# Patient Record
Sex: FEMALE | Race: WHITE | Employment: FULL TIME | URBAN - METROPOLITAN AREA
[De-identification: names, ages, dates, MRNs, and addresses within clinical notes are randomized per-mention and may not be internally consistent; named-entity substitution may affect disease eponyms.]

---

## 2019-12-19 ENCOUNTER — HOSPITAL ENCOUNTER (OUTPATIENT)
Dept: PHYSICAL THERAPY | Age: 59
Discharge: HOME | End: 2019-12-19
Payer: COMMERCIAL

## 2019-12-19 PROCEDURE — 97162 PT EVAL MOD COMPLEX 30 MIN: CPT

## 2019-12-19 PROCEDURE — 97110 THERAPEUTIC EXERCISES: CPT

## 2019-12-19 NOTE — THERAPY EVALUATION
Montserrat Rich  : 1960  Primary: [facility]  Secondary: [facility] [address] at . Elvin Bauman [address]  [address]. [address], [address]  [phone]   [phone]          OUTPATIENT PHYSICAL THERAPY:Initial Assessment 2019   ICD-10: Treatment Diagnosis  Mixed incontinence (N39.46)   Other muscle spasm (R52.569)   Muscle weakness (generalized) (M62.81)   Other lack of coordination (R27.8)   Precautions/Allergies:   Sulfa (sulfonamide antibiotics); Celecoxib; and Pcn [penicillins]   TREATMENT PLAN:  Effective Dates: 2019 TO 3/18/2020 (90 days). Frequency/Duration: 1 time a week for 90 Day(s) MEDICAL/REFERRING DIAGNOSIS:  Full incontinence of feces [R15.9]   DATE OF ONSET: May 2017  REFERRING PHYSICIAN: Kath De La Rosa MD MD Orders: evaluate and treat  Return MD Appointment: 2020     INITIAL ASSESSMENT:  Ms. Rowdy Castillo presents to physical therapy with c/o pain with vaginal insertion during intercourse and speculum and rectal examinations, urine and fecal leakage with lifting, bending, and post intercourse. She presents with decreased vaginal vault size, muscle guarding with entry into vagina with internal PF assessment, tenderness at Ischiocavernosus and bulbocavernosus (B) and referral of L hip pain with palpation of L levator ani muscles. Pt also presents with multiple scars with decreased mobility, pain along anterior pelvis and pubic bone. She lack PFM coordination and strength due to muscle guarding and tightness. These S/S are indicative of Mixed urinary incontinence, fecal incontinence, decreased coordination and strength and muscle spasms. Patient will benefit from manual interventions (soft tissue mobilizations, joint mobilizations, TDN, MET,etc.) as indicated, therapeutic exercises/activities as indicated, and biofeedback for neuromuscular re-education to improve patient's quality of life and functional mobility. PROBLEM LIST (Impacting functional limitations):  1. Decreased Strength  2. Decreased ADL/Functional Activities  3. Decreased Transfer Abilities  4. Decreased Ambulation Ability/Technique  5. Increased Pain  6. Decreased Activity Tolerance  7. Increased Fatigue  8. Increased Shortness of Breath  9. Decreased Flexibility/Joint Mobility  10. Decreased Skin Integrity/Hygeine  11. Decreased [place] with Home Exercise Program INTERVENTIONS PLANNED:  1. Balance Exercise  2. Bed Mobility  3. Cold  4. Electrical Stimulation  5. Family Education  6. Gait Training  7. Heat  8. Home Exercise Program (HEP)  9. Manual Therapy  10. Neuromuscular Re-education/Strengthening  11. Range of Motion (ROM)  12. Therapeutic Activites  13. Therapeutic Exercise/Strengthening  14. Transcutaneous Electrical Nerve Stimulation (TENS)  15. Transfer Training  16. Ultrasound (US)     GOALS: (Goals have been discussed and agreed upon with patient.)  Short-Term Functional Goals: Time Frame: 6 weeks  1. Patient will be independent and compliant with HEP   2. Patient will tolerate manual interventions (joint mobilizations, STM, TDN, trigger point release, PROM, etc.) to improve joint ROM/soft tissue mobility of restricted structures to improve functional mobility. 3. Patient will report <3/10 pain with functional ADLs and vaginal insertion to have annual speculum and rectal examinations done to improve quality of life  Discharge Goals: Time Frame: 12 weeks  1. Pt will be independent with comprehensive HEP  2. Pt will demonstrate proper lifting mechanics to lift 10# from floor to stand 10x with min to no pain or compensation or leaking of urine of feces to demonstrate improved pressure control system for pelvic floor health. 3. Pt will report <1-2/10 pain with functional ADLs and vaginal insertion to have annual speculum and rectal examinations done  for improved mobility.    4. Pt will report increase water intake to >60 oz for good bladder health    Outcome Measure: Tool Used: Pelvic Floor Disability Index (PFDI-20)  Score:  Initial: 32 Most Recent: X (Date: -- )   Interpretation of Score: This survey asks questions concerning certain  bowel, bladder, or pelvic symptoms and how much this symptoms interfere with daily activiites. Each section is scored on a 0-4 scale, 4 representing the greatest disability. The scores of each section are added together for a total score out of 70. Medical Necessity:   · Patient is expected to demonstrate progress in strength, range of motion and coordination to reduce pain and urinary and fecal leakage with daily activities to improve quality of life    Reason for Services/Other Comments:  · Patient continues to require skilled intervention due to above mentioned deficits    Total Duration: Evaluation 30 minutes, treatment 30 minutes       Rehabilitation Potential For Stated Goals: [address] Cloud's therapy, I certify that the treatment plan above will be carried out by a therapist or under their direction. Thank you for this referral,  Марина Graham     Referring Physician Signature: Sury Nava MD              Date                    PAIN/SUBJECTIVE:   Initial:   2/10 Post Session:  3/10     HISTORY:   History of Present Injury/Illness (Reason for Referral):  Greg Sorto states she was dx with rectal cancer in May 2017 and had a colostomy bag until June 2018. She was still dealing with fecal incontinence she had a device placed May 2019 in her back to help her bulk up her stool and hold it to get to the bathroom; NEUROSTIMULATOR ELECTRODE ARRAY;SACRAL NERVE (TRANSFORAMINAL PLACEMENT). She then went to see a nutritionist to help her bulk her stool.      Urinary: Frequency 10 x/day, 1 x/night.                       -Positive for stress urinary incontinence (ABIGAIL), urge urinary incontinence (UUI), mixed urinary incontinence (VALERY), urgency, frequency                     -Pt uses/does not use pads for protection; 1-3/4 pads per day (PPD)                     -Denies , incomplete emptying, urinary hesitancy, dysuria, hematuria, nocturia. Fluid intake: 16 oz water/day; bladder irritants include: 2 cups Coffee, 1-2 gatorade, 1-2 cans of tea    Bowel: Frequency 3x/day. -Positive for pushing/straining with BM, incomplete emptying, fecal incontinence.                      -Negative for pain with bowel movement (BM), pushing/straining with BM, incomplete emptying, fecal incontinence, constipation.                       -[place] stool type: 6-7 but mostly 4-5                      -Use of stool softeners or laxatives? No-uses -Diphen/atrop 2.5 mph1x/aday and 1 Fibercon a day    Sexual: Pt is sexually active. -Male partners.                    -Positive for dyspareunia. -went to the [facility] [place] and they recommended a different lubricant (water based) and vibrator which has seemed to help with the lubrication better.  -A couple of times I have had bowel movements after sex and its embarrassing for me.     Geraldine Mcdaniel Pain: Location: anterior pelvic area and L anterior groin.                         -Worse with sex. -Relieved with a few minutes after sex. -Max pain 6-7/10. Min pain 0/10. Past Medical History/Comorbidities:   Ms. Katherine Llamas  has a past medical history of Chronic pain.  She also has no past medical history of Arrhythmia, Arthritis, Asthma, Autoimmune disease ([address] CAD (coronary artery disease), Cancer ([address] Chronic kidney disease, Coagulation defects, COPD, Diabetes ([address] Difficult intubation, GERD (gastroesophageal reflux disease), Heart failure ([address] Hypertension, Liver disease, Malignant hyperthermia due to anesthesia, Nausea & vomiting, Other ill-defined conditions(799.89), Pseudocholinesterase deficiency, Psychiatric disorder, PUD (peptic ulcer disease), Seizures ([address] Stroke ([address] Thromboembolus ([facility] Thyroid disease, Unspecified adverse effect of anesthesia, or Unspecified sleep apnea. Ms. Chantale Fung  has a past surgical history that includes hx heent and hx  section. NEUROSTIMULATOR ELECTRODE ARRAY;SACRAL NERVE (TRANSFORAMINAL PLACEMENT) (May 2019) and Rectal surgical removal of cancer (2017)    Social History/Living Environment:    Lives with   Have you ever had any pelvic trauma (orthopedic in nature, fall, MVA, etc.)? no  Have you ever experienced any unwanted physical or sexual contact? no  Have you ever experienced any form of medical trauma (GYN, urological, GI, etc)? -3 vaginal births and 1  due to breach birth    Prior Level of Function/Work/Activity:   lifting, moving around       Ambulatory/Rehab Services H2 Model Falls Risk Assessment    Risk Factors:       No Risk Factors Identified Ability to Rise from Chair:       (0)  Ability to rise in a single movement    Falls Prevention Plan:       No modifications necessary   Total: (5 or greater = High Risk): 0     [facility] of [employer]. All Rights Reserved. [facility] Patent #[id number]. Federal Law prohibits the replication, distribution or use without written permission from [facility] [employer]     Current Medications:       Current Outpatient Medications:     mirabegron ER (MYRBETRIQ) 50 mg ER tablet, Take 50 mg by mouth daily. , Disp: , Rfl:     MYRBETRIQ 50 mg ER tablet, Take 1 Tab by mouth daily. , Disp: 30 Tab, Rfl: 11   Date Last Reviewed:  2019   Number of Personal Factors/Comorbidities that affect the Plan of Care: 3+: HIGH COMPLEXITY   EXAMINATION:   OBSERVATION:   External Observation:   · Voluntary Contraction: absent  · Voluntary Relaxation: guarded-unable  · Involuntary Contraction: minimal  · Involuntary Relaxation: guarded  · Perineal Body Assessment: WFL  · Anal [place]: absent  · Skin Integrity: good  · Vaginal Vault Size: decreased    PALPATION: -unable to assess further into layer 2 of PFM due to pain and muscle guarding    Superficial Pelvic Floor Musculature (PFM): Tender? Intermediate PFM Tender? Deep PFM Tender? External Palpation Tender? R Superficial Transverse Perineal Y R Deep Transverse Perineal  R. Puborectalis  Abdominals Y   L Superficial Transverse Perineal Y L Deep Transverse Perineal  L. Puborectalis Y R/L hip adductors Y   R Ischiocavernosus Y R Compressor Urethra  R. Pubococcygeus  R/L Iliacus/psoas Y   L Ischiocavernosus Y L Compressor Urethra  L. Pubococcygeus  R/L Glutes     R Bulbocavernosus Y   R. Ileococcygeus  R/L Piriformis    L Bulbocavernosus Y   L. Ileococcygeus          R. Obturator Internus          L. Obturator Internus          R. Coccygeus          L. Coccygeus        RANGE OF MOTION:  Pain in abdomen with end-range hip flexion B and hip abduction due to hip adductor tightness    STRENGTH:  P: Power, E: Endurance, R: Repetitions, QF: Quick Flicks, TrA: Transverse Abdominus  P 1   E    R    QF    TrA      COORDINATION:  Diaphragmatic breathing (DB): primary chest    SPECIAL TESTS:  Q-tip test: nt  Supine cough test: neg  POP-Q: (Pelvic Organ Prolapse - Quantification Exam) per MD note: none ntoed    SEMG evaluation (Date TBD):  ·  Resting Tone:   ·  Quality of Resting Tone: Not tested  ·  5 Second Hold:  uV  ·  10 Second Hold: uV  ·  Quality of Recruitment:   ·  Quality of Relaxation:   ·  Quality of Holding:   ·  Stability of Hold:   ·  Stability of Rest:      Body Structures Involved:  1. Nerves  2. Thoracic Cage  3. Digestive Structures  4. Bones  5. Joints  6. Muscles  7. Ligaments Body Functions Affected:  1. Sensory/Pain  2. Respiratory  3. Genitourinary  4. Neuromusculoskeletal  5. Movement Related  6. Digestive Activities and Participation Affected:  1. Learning and Applying Knowledge  2. General Tasks and Demands  3. Communication  4.  Mobility   Number of elements (examined above) that affect the Plan of Care: 3: MODERATE COMPLEXITY   CLINICAL PRESENTATION:   Presentation: Evolving clinical presentation with changing clinical characteristics: MODERATE COMPLEXITY   CLINICAL DECISION MAKING:   Use of outcome tool(s) and clinical judgement create a POC that gives a: Questionable prediction of patient's progress: MODERATE COMPLEXITY        Jeanne Mantilla PT, DPT

## 2019-12-19 NOTE — PROGRESS NOTES
OUTPATIENT PHYSICAL THERAPY: Daily Treatment Note 12/19/2019  Visit Count:  1      ICD-10: Treatment Diagnosis  Mixed incontinence (N39.46)   Other muscle spasm (R20.403)   Muscle weakness (generalized) (M62.81)   Other lack of coordination (R27.8)   Precautions/Allergies:   Sulfa (sulfonamide antibiotics); Celecoxib; and Pcn [penicillins]   TREATMENT PLAN:  Effective Dates: 12/19/2019 TO 3/18/2020 (90 days). Frequency/Duration: 1 time a week for 90 Day(s) MEDICAL/REFERRING DIAGNOSIS:  Full incontinence of feces [R15.9]   DATE OF ONSET: May 2017  REFERRING PHYSICIAN: Halle Phan MD MD Orders: evaluate and treat  Return MD Appointment: March 2020     MEDICAL/REFERRING DIAGNOSIS:  Full incontinence of feces [R15.9]   REFERRING PHYSICIAN: Halle Phan MD  Pre-treatment Symptoms/Complaints:  Pain with intercourse and leaking urine and feces. Pain: Initial:   2 Post Session:  3/10   Medications Last Reviewed:  12/19/2019    Updated Objective Findings:  See evaluation note from today  Very guarded with vaginal insertion  TTP IC,BC (B) and STP and L levator ani  Mulitple scar   TREATMENT:   THERAPEUTIC EXERCISE: (30 minutes):  Exercises per grid below to improve mobility, strength, and coordination. Required moderate visual, verbal, manual, and tactile cues to promote proper body alignment, promote proper body posture, promote proper body mechanics, and promote proper body breathing techniques. Progressed resistance, range, repetitions, and complexity of movement as indicated.     Exercises:  Patient instructed in pelvic floor exercises listed below:   Date:  12/19/19 Date:   Date:     Activity/Exercise Parameters Parameters Parameters   Education HEP, POC, PT goals, PF anatomy/physiology, proper toileting and increase water for bladder health     PF connection breath 5 mins     PF drops 5x     Frog stretch 1 min     Happy baby 1 min     Initial stretching #1 Handout provided and edu on stretches     Self scar mobilizations 2 mins-PT demonstrated                                 MANUAL THERAPY: (0 minutes): Joint mobilization and Soft tissue mobilization was utilized and necessary because of the patient's restricted joint motion, painful spasm, loss of articular motion, and restricted motion of soft tissue. (Used abbreviations: MET - muscle energy technique; SCS- Strain counter strain; CTM- Connective tissue mobilizations; CR- Contract/relax; SP- Sustained pressure, TrP- Trigger point release, IASTM- Instrument assisted soft tissue mobilizations, TDN- Trigger point dry needling). Pt gives verbal and written consent to internal assessment/treatment; no chaperon present. [employer]  The following educational topics were reviewed with patient:  pelvic floor anatomy/pathology and function related to her symptoms, water intake increase, bladder health tips, proper toileting position. Treatment/Session Summary:    · Response to Treatment:  Pt demonstrated understanding of POC and PT goals. Pt instructed in initial HEP, minor increase in PFM soreness due to muscle guarding with initial assessment. · Communication/Consultation:  POC, PT goals, HEP   · Equipment provided today:  HEP handout   · Recommendations/Intent for next treatment session: Next visit will focus on Manual interventions as indicated, progression of exercises/ROM as indicated.     Total Treatment Billable Duration:  30 eval, 30 therex minutes  PT Patient Time In/Time Out  Time In: 0910  [facility]    Future Appointments   Date Time Provider Yaya Mast   12/26/2019 11:15 AM Rambo Veronica [facility]   1/9/2020 10:00 [facility]   1/16/2020 10:00 AM Priya [facility]   1/23/2020 10:00 AM Rambo Veronica [facility]

## 2019-12-26 ENCOUNTER — HOSPITAL ENCOUNTER (OUTPATIENT)
Dept: PHYSICAL THERAPY | Age: 59
Discharge: HOME | End: 2019-12-26
Payer: COMMERCIAL

## 2019-12-26 PROCEDURE — 97110 THERAPEUTIC EXERCISES: CPT

## 2019-12-26 PROCEDURE — 97140 MANUAL THERAPY 1/> REGIONS: CPT

## 2020-01-08 NOTE — PROGRESS NOTES
OUTPATIENT PHYSICAL THERAPY: Daily Treatment Note 1/9/2020  Visit Count:  3    Patient has Neuro stimulator; NO BIOFEEDBACK or E-STIM     ICD-10: Treatment Diagnosis  Mixed incontinence (N39.46)   Other muscle spasm (X10.869)   Muscle weakness (generalized) (M62.81)   Other lack of coordination (R27.8)   Precautions/Allergies:   Sulfa (sulfonamide antibiotics); Celecoxib; and Pcn [penicillins]   TREATMENT PLAN:  Effective Dates: 12/19/2019 TO 3/18/2020 (90 days). Frequency/Duration: 1 time a week for 90 Day(s) MEDICAL/REFERRING DIAGNOSIS:  Full incontinence of feces [R15.9]   DATE OF ONSET: May 2017  REFERRING PHYSICIAN: Arsenio Mccoy MD MD Orders: evaluate and treat  Return MD Appointment: March 2020     MEDICAL/REFERRING DIAGNOSIS:  Full incontinence of feces [R15.9]   REFERRING PHYSICIAN: Arsenio Mccoy MD  Pre-treatment Symptoms/Complaints:  Find that I drink more water at work than home, but still doing better. My  also says he likes not  Seeing the cringing on my face anymore intercourse. I am having much less pain with intercourse! Pain: Initial:   2 Post Session:  0/10   Medications Last Reviewed:  1/9/2020    Updated Objective Findings:  See evaluation note from initial visit and below for updated objective measures from treatment sessions  Very guarded with vaginal insertion  TTP IC,BC (B) and STP and L levator ani  Mulitple scar   TREATMENT:   THERAPEUTIC EXERCISE: (8 minutes):  Exercises per grid below to improve mobility, strength, and coordination. Required moderate visual, verbal, manual, and tactile cues to promote proper body alignment, promote proper body posture, promote proper body mechanics, and promote proper body breathing techniques. Progressed resistance, range, repetitions, and complexity of movement as indicated.     Exercises:  Patient instructed in pelvic floor exercises listed below:   Date:  12/19/19 Date:  12/26/19 Date:  1/9/20   Activity/Exercise Parameters Parameters Parameters   Education HEP, POC, PT goals, PF anatomy/physiology, proper toileting and increase water for bladder health  PF relaxation continuation, stretches   PF connection breath 5 mins     PF drops 5x     Frog stretch 1 min     Happy baby 1 min 2 mins    Initial stretching #1 Handout provided and edu on stretches     Self scar mobilizations 2 mins-PT demonstrated     Hip flexors stretch EOB  2 mins ea    Prone Rec Fem stretch  2 mins ea    Fwd fold stretch  5x    Seated piriformis stretch  2 min    Prone press up abdominal stretching   3 mins   Cat/Cow   Unable due to discomfort   DKTC   2 mins       MANUAL THERAPY: (40 minutes): Joint mobilization and Soft tissue mobilization was utilized and necessary because of the patient's restricted joint motion, painful spasm, loss of articular motion, and restricted motion of soft tissue. (Used abbreviations: MET - muscle energy technique; SCS- Strain counter strain; CTM- Connective tissue mobilizations; CR- Contract/relax; SP- Sustained pressure, TrP- Trigger point release, IASTM- Instrument assisted soft tissue mobilizations, TDN- Trigger point dry needling). -abdominal massage  -Iliopsoas release (B)  -lumbar paraspinal massage  -OI release R  -iliococcygeus release (R)  Sacral mobilization grade II-III P/A on sacral base  -Long axis traction R and passive IR stretching        Pt gives verbal and written consent to internal assessment/treatment; no chaperon present. [employer]  The following educational topics were reviewed with patient:  pelvic floor anatomy/pathology and function related to her symptoms, water intake increase, bladder health tips, proper toileting position. Treatment/Session Summary:    · Response to Treatment:  Pt demonstrated initial 7/10 pain at R levator ani muscle group and OI that was resolved to 0/10 post manual release.  Difficult to perform Cat/cow due to lumbar mobility restrictions   · Communication/Consultation: POC, PT goals, HEP   · Equipment provided today:  HEP handout   · Recommendations/Intent for next treatment session: Next visit will focus on Manual interventions as indicated, progression of exercises/ROM as indicated.     Total Treatment Billable Duration:  48 mins  PT Patient Time In/Time Out  Time In: 1005  [facility]: [facility]   Date Time Provider Yaya Mast   1/16/2020 10:00 AM Isabelle [facility]   1/23/2020 10:00 AM Yue Leroy [facility]

## 2020-01-09 ENCOUNTER — HOSPITAL ENCOUNTER (OUTPATIENT)
Dept: PHYSICAL THERAPY | Age: 60
Discharge: HOME | End: 2020-01-09
Payer: COMMERCIAL

## 2020-01-09 PROCEDURE — 97140 MANUAL THERAPY 1/> REGIONS: CPT

## 2020-01-09 PROCEDURE — 97110 THERAPEUTIC EXERCISES: CPT

## 2020-01-17 ENCOUNTER — HOSPITAL ENCOUNTER (OUTPATIENT)
Dept: PHYSICAL THERAPY | Age: 60
Discharge: HOME | End: 2020-01-17
Payer: COMMERCIAL

## 2020-01-17 PROCEDURE — 97110 THERAPEUTIC EXERCISES: CPT

## 2020-01-17 PROCEDURE — 97140 MANUAL THERAPY 1/> REGIONS: CPT

## 2020-01-17 NOTE — PROGRESS NOTES
OUTPATIENT PHYSICAL THERAPY: Daily Treatment Note 1/17/2020  Visit Count:  4    Patient has Neuro stimulator; NO BIOFEEDBACK or E-STIM     ICD-10: Treatment Diagnosis  Mixed incontinence (N39.46)   Other muscle spasm (G39.227)   Muscle weakness (generalized) (M62.81)   Other lack of coordination (R27.8)   Precautions/Allergies:   Sulfa (sulfonamide antibiotics); Celecoxib; and Pcn [penicillins]   TREATMENT PLAN:  Effective Dates: 12/19/2019 TO 3/18/2020 (90 days). Frequency/Duration: 1 time a week for 90 Day(s) MEDICAL/REFERRING DIAGNOSIS:  Full incontinence of feces [R15.9]   DATE OF ONSET: May 2017  REFERRING PHYSICIAN: Bri Chambers MD MD Orders: evaluate and treat  Return MD Appointment: March 2020     MEDICAL/REFERRING DIAGNOSIS:  Full incontinence of feces [R15.9]   REFERRING PHYSICIAN: Bri Chambers MD  Pre-treatment Symptoms/Complaints:  I have a bad case of hemorrhoids right now because pushing and straining. Also I had ice cream last night and up going to the bathroom at night. I go see my Chemo doctor for my 6 month check up. Still liking the piriformis stretch and getting more range and making all the stretches apart of my daily routine. Pain: Initial:   0 Post Session:  0/10   Medications Last Reviewed:  1/17/2020    Updated Objective Findings:  See evaluation note from initial visit and below for updated objective measures from treatment sessions  Very guarded with vaginal insertion  TTP IC,BC (B) and STP and L levator ani  Mulitple scar   TREATMENT:   THERAPEUTIC EXERCISE: (10 minutes):  Exercises per grid below to improve mobility, strength, and coordination. Required moderate visual, verbal, manual, and tactile cues to promote proper body alignment, promote proper body posture, promote proper body mechanics, and promote proper body breathing techniques. Progressed resistance, range, repetitions, and complexity of movement as indicated.     Exercises:  Patient instructed in pelvic floor exercises listed below:   Date:  12/19/19 Date:  12/26/19 Date:  1/9/20 Date  1/17/20   Activity/Exercise Parameters Parameters Parameters    Education HEP, POC, PT goals, PF anatomy/physiology, proper toileting and increase water for bladder health  PF relaxation continuation, stretches    PF connection breath 5 mins      PF drops 5x   5 MINS tactile and visual cues   Frog stretch 1 min      Happy baby 1 min 2 mins     Initial stretching #1 Handout provided and edu on stretches      Self scar mobilizations 2 mins-PT demonstrated      Hip flexors stretch EOB  2 mins ea  2 min ea   Prone Rec Fem stretch  2 mins ea     Fwd fold stretch  5x     Seated piriformis stretch  2 min  1 min   Prone press up abdominal stretching   3 mins    Cat/Cow   Unable due to discomfort    DKTC   2 mins    IR stretch    1 min ea       MANUAL THERAPY: (30 minutes): Joint mobilization and Soft tissue mobilization was utilized and necessary because of the patient's restricted joint motion, painful spasm, loss of articular motion, and restricted motion of soft tissue. (Used abbreviations: MET - muscle energy technique; SCS- Strain counter strain; CTM- Connective tissue mobilizations; CR- Contract/relax; SP- Sustained pressure, TrP- Trigger point release, IASTM- Instrument assisted soft tissue mobilizations, TDN- Trigger point dry needling). -abdominal massage  -Iliopsoas release (B)  -lumbar paraspinal massage (NOT TODAY)  -OI release R ( NOT TODAY)  -iliococcygeus release (R)  Sacral mobilization grade II-III P/A on sacral base (NOT TODAY)  -Long axis traction R and passive IR stretching  (NOT TODAY)          Pt gives verbal and written consent to internal assessment/treatment; no chaperon present. [employer] Portal  The following educational topics were reviewed with patient:  pelvic floor anatomy/pathology and function related to her symptoms, water intake increase, bladder health tips, proper toileting position.   Treatment/Session Summary:    · Response to Treatment:  Pt demonstrated almost no pain with manual interventions today. Improved PFM group ROM noted with PF drops today. · Communication/Consultation:  POC, PT goals, HEP   · Equipment provided today:  HEP handout   · Recommendations/Intent for next treatment session: Next visit will focus on Manual interventions as indicated, progression of exercises/ROM as indicated.     Total Treatment Billable Duration:  40 mins  PT Patient Time In/Time Out  Time In: 4969  Time Out: [address] [facility] [address] [place]   Date Time Provider Yaya Mast   1/23/2020 10:00 AM Raoul Root [facility]

## 2020-01-22 NOTE — PROGRESS NOTES
OUTPATIENT PHYSICAL THERAPY: Daily Treatment Note 1/23/2020  Visit Count:  5    Patient has Neuro stimulator; NO BIOFEEDBACK or E-STIM     ICD-10: Treatment Diagnosis  Mixed incontinence (N39.46)   Other muscle spasm (R11.767)   Muscle weakness (generalized) (M62.81)   Other lack of coordination (R27.8)   Precautions/Allergies:   Sulfa (sulfonamide antibiotics); Celecoxib; and Pcn [penicillins]   TREATMENT PLAN:  Effective Dates: 12/19/2019 TO 3/18/2020 (90 days). Frequency/Duration: 1 time a week for 90 Day(s) MEDICAL/REFERRING DIAGNOSIS:  Full incontinence of feces [R15.9]   DATE OF ONSET: May 2017  REFERRING PHYSICIAN: Luigi Morrison MD MD Orders: evaluate and treat  Return MD Appointment: March 2020     MEDICAL/REFERRING DIAGNOSIS:  Full incontinence of feces [R15.9]   REFERRING PHYSICIAN: Luigi Morrison MD  Pre-treatment Symptoms/Complaints:  I have been drinking more water and eating better, so have not had to push or strain. Stretching while playing games now to get the stretches in. I went to see Chemo doctor and only have one more 6 month check up then a check up every 3 years. Pain: Initial:   0 Post Session:  0/10   Medications Last Reviewed:  1/23/2020    Updated Objective Findings:  See evaluation note from initial visit and below for updated objective measures from treatment sessions  Very guarded with vaginal insertion  TTP IC,BC (B) and STP and L levator ani  Mulitple scar   TREATMENT:   THERAPEUTIC EXERCISE: (15 minutes):  Exercises per grid below to improve mobility, strength, and coordination. Required moderate visual, verbal, manual, and tactile cues to promote proper body alignment, promote proper body posture, promote proper body mechanics, and promote proper body breathing techniques. Progressed resistance, range, repetitions, and complexity of movement as indicated.     Exercises:  Patient instructed in pelvic floor exercises listed below:   Date:  12/19/19 Date:  12/26/19 Date:  1/9/20 Date  1/17/20 Date  1/23/20   Activity/Exercise Parameters Parameters Parameters     Education HEP, POC, PT goals, PF anatomy/physiology, proper toileting and increase water for bladder health  PF relaxation continuation, stretches     PF connection breath 5 mins       PF drops 5x   5 MINS tactile and visual cues 5 mins tactile cues   Frog stretch 1 min       Happy baby 1 min 2 mins   DKTC 2 mins   Initial stretching #1 Handout provided and edu on stretches       Self scar mobilizations 2 mins-PT demonstrated       Hip flexors stretch EOB  2 mins ea  2 min ea    Prone Rec Fem stretch  2 mins ea      Fwd fold stretch  5x      Seated piriformis stretch  2 min  1 min    Prone press up abdominal stretching   3 mins     Cat/Cow   Unable due to discomfort  3 mins with tactile and visual cues   DKTC   2 mins     IR stretch    1 min ea    Belly breathing         Pelvic tilt seated     10x for pelvic and lumbar mobiltiy   Hip adductor stretch     30\" 3x easeated and standing       MANUAL THERAPY: (41 minutes): Joint mobilization and Soft tissue mobilization was utilized and necessary because of the patient's restricted joint motion, painful spasm, loss of articular motion, and restricted motion of soft tissue. (Used abbreviations: MET - muscle energy technique; SCS- Strain counter strain; CTM- Connective tissue mobilizations; CR- Contract/relax; SP- Sustained pressure, TrP- Trigger point release, IASTM- Instrument assisted soft tissue mobilizations, TDN- Trigger point dry needling).    -abdominal massage  -glute and piriformis release (B) the R tighter than the L  -lumbar paraspinal massage   -OI release R ( NOT TODAY)  -iliococcygeus release and STP/DTP (R)  Sacral mobilization grade II-III P/A on sacral base  -Long axis traction R and passive IR stretching  (NOT TODAY)  -Lumbar and lower thoracic joint mobilizations grade III          Pt gives verbal and written consent to internal assessment/treatment; no ernesto present. [employer] Portal  The following educational topics were reviewed with patient:  pelvic floor anatomy/pathology and function related to her symptoms, water intake increase, bladder health tips, proper toileting position. Treatment/Session Summary:    · Response to Treatment:  Pt demonstrated increased tension and pain in R glut and iliococcygeus muscle today and R DTP that reduced with manual interventions. Pt very challenged with Cat/cow form but did better with pelvic tilts seated to get lumbar and pelvic dissociation and ROM. · Communication/Consultation:  POC, PT goals, HEP   · Equipment provided today:  HEP handout   · Recommendations/Intent for next treatment session: Next visit will focus on Manual interventions as indicated, progression of exercises/ROM as indicated.     Total Treatment Billable Duration:  56 mins  PT Patient Time In/Time Out  Time In: 6952  Time Out: [address]   Date Time Provider Yaya Mast   1/23/2020 10:00 AM Vaughn Arambula [facility]

## 2020-01-23 ENCOUNTER — HOSPITAL ENCOUNTER (OUTPATIENT)
Dept: PHYSICAL THERAPY | Age: 60
Discharge: HOME | End: 2020-01-23
Payer: COMMERCIAL

## 2020-01-23 PROCEDURE — 97140 MANUAL THERAPY 1/> REGIONS: CPT

## 2020-01-23 PROCEDURE — 97110 THERAPEUTIC EXERCISES: CPT

## 2020-02-06 ENCOUNTER — HOSPITAL ENCOUNTER (OUTPATIENT)
Dept: PHYSICAL THERAPY | Age: 60
Discharge: HOME | End: 2020-02-06
Payer: COMMERCIAL

## 2020-02-06 PROCEDURE — 97140 MANUAL THERAPY 1/> REGIONS: CPT

## 2020-02-06 PROCEDURE — 97110 THERAPEUTIC EXERCISES: CPT

## 2020-02-06 NOTE — PROGRESS NOTES
OUTPATIENT PHYSICAL THERAPY: Daily Treatment Note 2/6/2020  Visit Count:  6    Patient has Neuro stimulator; NO BIOFEEDBACK or E-STIM     ICD-10: Treatment Diagnosis  Mixed incontinence (N39.46)   Other muscle spasm (G52.983)   Muscle weakness (generalized) (M62.81)   Other lack of coordination (R27.8)   Precautions/Allergies:   Sulfa (sulfonamide antibiotics); Celecoxib; and Pcn [penicillins]   TREATMENT PLAN:  Effective Dates: 12/19/2019 TO 3/18/2020 (90 days). Frequency/Duration: 1 time a week for 90 Day(s) MEDICAL/REFERRING DIAGNOSIS:  Full incontinence of feces [R15.9]   DATE OF ONSET: May 2017  REFERRING PHYSICIAN: Jane Carpenter MD MD Orders: evaluate and treat  Return MD Appointment: March 2020     MEDICAL/REFERRING DIAGNOSIS:  Full incontinence of feces [R15.9]   REFERRING PHYSICIAN: Jane Carpenter MD  Pre-treatment Symptoms/Complaints:  I have been drinking more water and up to 32 oz. I have been doing well with the stretches too. I still leak urine occasionally but not as much. Mostly leaking when Im coughing or rushing to the bathroom. I have been breathing better with lifting but also going to the bathroom before any of the heavy lifting. Less pain though with intercourse still! Also BM are better. Pain: Initial:   0 Post Session:  0/10   Medications Last Reviewed:  2/6/2020    Updated Objective Findings:  See evaluation note from initial visit and below for updated objective measures from treatment sessions  Very guarded with vaginal insertion  TTP IC,BC (B) and STP and L levator ani  Mulitple scar   TREATMENT:   THERAPEUTIC EXERCISE: (10 minutes):  Exercises per grid below to improve mobility, strength, and coordination. Required moderate visual, verbal, manual, and tactile cues to promote proper body alignment, promote proper body posture, promote proper body mechanics, and promote proper body breathing techniques.   Progressed resistance, range, repetitions, and complexity of movement as indicated. Exercises:  Patient instructed in pelvic floor exercises listed below:   Date:  12/19/19 Date:  12/26/19 Date:  1/9/20 Date  1/17/20 Date  1/23/20 Date  2/6/20   Activity/Exercise Parameters Parameters Parameters      Education HEP, POC, PT goals, PF anatomy/physiology, proper toileting and increase water for bladder health  PF relaxation continuation, stretches   Bladder health and urge suppression. PT provided skilled edu on bladder physiology and filling time and bladder triaining   PF connection breath 5 mins        PF drops 5x   5 MINS tactile and visual cues 5 mins tactile cues 5 mins tactile cues   Frog stretch 1 min        Happy baby 1 min 2 mins   DKTC 2 mins    Initial stretching #1 Handout provided and edu on stretches        Self scar mobilizations 2 mins-PT demonstrated        Hip flexors stretch EOB  2 mins ea  2 min ea     Prone Rec Fem stretch  2 mins ea       Fwd fold stretch  5x       Seated piriformis stretch  2 min  1 min     Prone press up abdominal stretching   3 mins      Cat/Cow   Unable due to discomfort  3 mins with tactile and visual cues    DKTC   2 mins      IR stretch    1 min ea     Belly breathing          Pelvic tilt seated     10x for pelvic and lumbar mobiltiy    Hip adductor stretch     30\" 3x easeated and standing        MANUAL THERAPY: (30 minutes): Joint mobilization and Soft tissue mobilization was utilized and necessary because of the patient's restricted joint motion, painful spasm, loss of articular motion, and restricted motion of soft tissue. (Used abbreviations: MET - muscle energy technique; SCS- Strain counter strain; CTM- Connective tissue mobilizations; CR- Contract/relax; SP- Sustained pressure, TrP- Trigger point release, IASTM- Instrument assisted soft tissue mobilizations, TDN- Trigger point dry needling).    -abdominal massage  -DTP STRETCHING and release B  -glute and piriformis release (B) the R tighter than the L (NOT TODAY)  -lumbar paraspinal massage (NOT TODAY)  -OI release R ( NOT TODAY)  -iliococcygeus release and STP/DTP (b)  Sacral mobilization grade II-III P/A on sacral base (NOT TODAY)  -Long axis traction R and passive IR stretching  (NOT TODAY)  -Lumbar and lower thoracic joint mobilizations grade III (NOT TODAY)          Pt gives verbal and written consent to internal assessment/treatment; no chaperon present. [employer] Portal  The following educational topics were reviewed with patient:  pelvic floor anatomy/pathology and function related to her symptoms, water intake increase, bladder health tips, proper toileting position. Treatment/Session Summary:    · Response to Treatment:  Pt demonstrated increased PFM ROM today with much less guarding and pain, however still limited in her ROM so unable to to achieve PFM contraction. Pt responded well to trigger point release of DTP and B IC muscles. She demonstrated understanding of bladder retraining and urge suppression  · Communication/Consultation:  POC, PT goals, HEP   · Equipment provided today:  HEP handout - urge suppression and bladder filling  · Recommendations/Intent for next treatment session: Next visit will focus on Manual interventions as indicated, progression of exercises/ROM as indicated.     Total Treatment Billable Duration:  40 mins  PT Patient Time In/Time Out  Time In: 1010  Time Out: [address]   Date Time Provider Yaya Mast   2/13/2020 10:00 AM Karene Dukes [facility]   2/20/2020 10:00 AM Kulwinder GOMEZ [facility]   2/27/2020 10:00 AM [facility]

## 2020-02-13 ENCOUNTER — HOSPITAL ENCOUNTER (OUTPATIENT)
Dept: PHYSICAL THERAPY | Age: 60
Discharge: HOME | End: 2020-02-13
Payer: COMMERCIAL

## 2020-02-13 PROCEDURE — 97110 THERAPEUTIC EXERCISES: CPT

## 2020-02-13 PROCEDURE — 97140 MANUAL THERAPY 1/> REGIONS: CPT

## 2020-02-13 NOTE — PROGRESS NOTES
OUTPATIENT PHYSICAL THERAPY: Daily Treatment Note 2/13/2020  Visit Count:  7    Patient has Neuro stimulator; NO BIOFEEDBACK or E-STIM     ICD-10: Treatment Diagnosis  Mixed incontinence (N39.46)   Other muscle spasm (Y91.778)   Muscle weakness (generalized) (M62.81)   Other lack of coordination (R27.8)   Precautions/Allergies:   Sulfa (sulfonamide antibiotics); Celecoxib; and Pcn [penicillins]   TREATMENT PLAN:  Effective Dates: 12/19/2019 TO 3/18/2020 (90 days). Frequency/Duration: 1 time a week for 90 Day(s) MEDICAL/REFERRING DIAGNOSIS:  Full incontinence of feces [R15.9]   DATE OF ONSET: May 2017  REFERRING PHYSICIAN: Jane Carpenter MD MD Orders: evaluate and treat  Return MD Appointment: March 2020     MEDICAL/REFERRING DIAGNOSIS:  Full incontinence of feces [R15.9]   REFERRING PHYSICIAN: Jane Carpenter MD  Pre-treatment Symptoms/Complaints:  I have been doing better. I have not been leaking that much anymore and still trying to intake more water, but sometimes that will mess with the bowels. I did have a bad accident with my bowels because it was so liquidly and that was after having 64 oz of water. Bowels have gone back to normal since reducing back down to 32 oz of water. Pain: Initial:   0 Post Session:  0/10   Medications Last Reviewed:  2/13/2020    Updated Objective Findings:  See evaluation note from initial visit and below for updated objective measures from treatment sessions  Very guarded with vaginal insertion  TTP IC,BC (B) and STP and L levator ani  Mulitple scar   TREATMENT:   THERAPEUTIC EXERCISE: (9 minutes):  Exercises per grid below to improve mobility, strength, and coordination. Required moderate visual, verbal, manual, and tactile cues to promote proper body alignment, promote proper body posture, promote proper body mechanics, and promote proper body breathing techniques. Progressed resistance, range, repetitions, and complexity of movement as indicated.     Exercises: Patient instructed in pelvic floor exercises listed below:   Date:  12/19/19 Date:  12/26/19 Date:  1/9/20 Date  1/17/20 Date  1/23/20 Date  2/6/20 Date  2/13/20   Activity/Exercise Parameters Parameters Parameters       Education HEP, POC, PT goals, PF anatomy/physiology, proper toileting and increase water for bladder health  PF relaxation continuation, stretches   Bladder health and urge suppression. PT provided skilled edu on bladder physiology and filling time and bladder triaining Pt educated on bowel scheduling and bowel sensitivity with adding too much of one thing all at one time. Pt to add one cup a week of water and compliment that with more fiber. PF connection breath 5 mins         PF drops 5x   5 MINS tactile and visual cues 5 mins tactile cues 5 mins tactile cues    Frog stretch 1 min         Happy baby 1 min 2 mins   DKTC 2 mins     Initial stretching #1 Handout provided and edu on stretches         Self scar mobilizations 2 mins-PT demonstrated         Hip flexors stretch EOB  2 mins ea  2 min ea      Prone Rec Fem stretch  2 mins ea        Fwd fold stretch  5x        Seated piriformis stretch  2 min  1 min      Prone press up abdominal stretching   3 mins       Cat/Cow   Unable due to discomfort  3 mins with tactile and visual cues     DKTC   2 mins    2 mins   IR stretch    1 min ea      Belly breathing        2x5 with tactile cues and PF relaxation   Pelvic tilt seated     10x for pelvic and lumbar mobiltiy     Hip adductor stretch     30\" 3x easeated and standing         MANUAL THERAPY: (40 minutes): Joint mobilization and Soft tissue mobilization was utilized and necessary because of the patient's restricted joint motion, painful spasm, loss of articular motion, and restricted motion of soft tissue.    (Used abbreviations: MET - muscle energy technique; SCS- Strain counter strain; CTM- Connective tissue mobilizations; CR- Contract/relax; SP- Sustained pressure, TrP- Trigger point release, IASTM- Instrument assisted soft tissue mobilizations, TDN- Trigger point dry needling). -abdominal massage and MFR to bladder  -DTP STRETCHING and release B  -glute and piriformis release (B) the R tighter than the L (NOT TODAY)  -lumbar paraspinal massage (NOT TODAY)  -OI release R ( NOT TODAY)    Sacral mobilization grade II-III P/A on sacral base (NOT TODAY)  -benjamin release and contract/relax release to improve R hip moibilty to improve PF mobility as well R scar mobilizations secondary to scar tissue from previous surger  -Lumbar and lower thoracic joint mobilizations grade III (NOT TODAY)  -B Levator ani release and theles massage via vaginal opening        Pt gives verbal and written consent to internal assessment/treatment; no chaperon present. [employer] Portal  The following educational topics were reviewed with patient:  pelvic floor anatomy/pathology and function related to her symptoms, water intake increase, bladder health tips, proper toileting position. Treatment/Session Summary:    · Response to Treatment:  Pt demonstrated increased PFM ROM again today with min to no guarding and pain. Very tender and restricted with R hip flexor and abdominal ROM due to scar tissue from previous surgeries but improved following deep tissue release. Pt responded well to trigger point release of DTP and B Levator ani muscles. · Communication/Consultation:  POC, PT goals, HEP , bowel scheduling and water/fiber relationship  · Equipment provided today:  HEP handout - urge suppression and bladder filling  · Recommendations/Intent for next treatment session: Next visit will focus on Manual interventions as indicated, progression of exercises/ROM as indicated.     Total Treatment Billable Duration:  54 mins  PT Patient Time In/Time Out  Time In: 0935  Time Out: [address]    Date Time Provider Yaya Mast   2/20/2020 10:00 AM [facility]   2/27/2020 10:00 [facility]

## 2020-02-20 ENCOUNTER — HOSPITAL ENCOUNTER (OUTPATIENT)
Dept: PHYSICAL THERAPY | Age: 60
Discharge: HOME | End: 2020-02-20
Payer: COMMERCIAL

## 2020-02-20 PROCEDURE — 97140 MANUAL THERAPY 1/> REGIONS: CPT

## 2020-02-20 NOTE — PROGRESS NOTES
OUTPATIENT PHYSICAL THERAPY: Daily Treatment Note 2/20/2020  Visit Count:  8    Patient has Neuro stimulator; NO BIOFEEDBACK or E-STIM     ICD-10: Treatment Diagnosis  Mixed incontinence (N39.46)   Other muscle spasm (F17.062)   Muscle weakness (generalized) (M62.81)   Other lack of coordination (R27.8)   Precautions/Allergies:   Sulfa (sulfonamide antibiotics); Celecoxib; and Pcn [penicillins]   TREATMENT PLAN:  Effective Dates: 12/19/2019 TO 3/18/2020 (90 days). Frequency/Duration: 1 time a week for 90 Day(s) MEDICAL/REFERRING DIAGNOSIS:  Full incontinence of feces [R15.9]   DATE OF ONSET: May 2017  REFERRING PHYSICIAN: Cynthia Sage MD MD Orders: evaluate and treat  Return MD Appointment: March 2020     MEDICAL/REFERRING DIAGNOSIS:  Full incontinence of feces [R15.9]   REFERRING PHYSICIAN: Cynthia Sage MD  Pre-treatment Symptoms/Complaints:  I have been very stressed due to family issues with my son and my bowels have been very messed up. I went through 7 pads yesterday due to leakage of bowels because of the stress it was so much better, but the stress has made it come back. I even uped my device some and that has not helped. To be on the safe side I do not want to remove briefs today because I am scared I will have a larger accident    Pain: Initial:   5 Post Session:  0/10   Medications Last Reviewed:  2/20/2020    Updated Objective Findings:  See evaluation note from initial visit and below for updated objective measures from treatment sessions  Very guarded with vaginal insertion  TTP IC,BC (B) and STP and L levator ani  Mulitple scar   TREATMENT:   THERAPEUTIC EXERCISE: (0 minutes):  Exercises per grid below to improve mobility, strength, and coordination. Required moderate visual, verbal, manual, and tactile cues to promote proper body alignment, promote proper body posture, promote proper body mechanics, and promote proper body breathing techniques.   Progressed resistance, range, repetitions, and complexity of movement as indicated. Exercises:  Patient instructed in pelvic floor exercises listed below:   Date:  12/19/19 Date:  12/26/19 Date:  1/9/20 Date  1/17/20 Date  1/23/20 Date  2/6/20 Date  2/13/20    Activity/Exercise Parameters Parameters Parameters        Education HEP, POC, PT goals, PF anatomy/physiology, proper toileting and increase water for bladder health  PF relaxation continuation, stretches   Bladder health and urge suppression. PT provided skilled edu on bladder physiology and filling time and bladder triaining Pt educated on bowel scheduling and bowel sensitivity with adding too much of one thing all at one time. Pt to add one cup a week of water and compliment that with more fiber. PF connection breath 5 mins          PF drops 5x   5 MINS tactile and visual cues 5 mins tactile cues 5 mins tactile cues     Frog stretch 1 min          Happy baby 1 min 2 mins   DKTC 2 mins      Initial stretching #1 Handout provided and edu on stretches          Self scar mobilizations 2 mins-PT demonstrated          Hip flexors stretch EOB  2 mins ea  2 min ea       Prone Rec Fem stretch  2 mins ea         Fwd fold stretch  5x         Seated piriformis stretch  2 min  1 min       Prone press up abdominal stretching   3 mins        Cat/Cow   Unable due to discomfort  3 mins with tactile and visual cues      DKTC   2 mins    2 mins    IR stretch    1 min ea       Belly breathing        2x5 with tactile cues and PF relaxation    Pelvic tilt seated     10x for pelvic and lumbar mobiltiy      Hip adductor stretch     30\" 3x easeated and standing          MANUAL THERAPY: (54 minutes): Joint mobilization and Soft tissue mobilization was utilized and necessary because of the patient's restricted joint motion, painful spasm, loss of articular motion, and restricted motion of soft tissue.    (Used abbreviations: MET - muscle energy technique; SCS- Strain counter strain; CTM- Connective tissue mobilizations; CR- Contract/relax; SP- Sustained pressure, TrP- Trigger point release, IASTM- Instrument assisted soft tissue mobilizations, TDN- Trigger point dry needling). -abdominal massage and MFR to bladder  -DTP STRETCHING and release B (NOT TODAY)  -glute and piriformis release (B) the R tighter than the L   -lumbar AND thoracic paraspinal massage   -OI release R ( NOT TODAY)  -THORACIC AND JOINT P/A JOINT MOBILIZATIONS GRADE III-IV            Pt gives verbal and written consent to internal assessment/treatment; no chaperon present. [employer]  The following educational topics were reviewed with patient:  pelvic floor anatomy/pathology and function related to her symptoms, water intake increase, bladder health tips, proper toileting position. Treatment/Session Summary:    · Response to Treatment:  Pt demonstrated increased PFM ROM again today with min to no guarding and pain. Very tender and restricted with R hip flexor and abdominal ROM due to scar tissue from previous surgeries but improved following deep tissue release. Pt responded well to trigger point release of DTP and B Levator ani muscles. · Communication/Consultation:  POC, PT goals, HEP , bowel scheduling and water/fiber relationship  · Equipment provided today:  HEP handout - urge suppression and bladder filling  · Recommendations/Intent for next treatment session: Next visit will focus on Manual interventions as indicated, progression of exercises/ROM as indicated.     Total Treatment Billable Duration:  54 mins  PT Patient Time In/Time Out  Time In: 4269  [facility]   Date Time Provider Yaya Mast   2/27/2020 10:00 AM Maximilian [facility]

## 2020-02-27 ENCOUNTER — HOSPITAL ENCOUNTER (OUTPATIENT)
Dept: PHYSICAL THERAPY | Age: 60
Discharge: HOME | End: 2020-02-27
Payer: COMMERCIAL

## 2020-02-27 PROCEDURE — 97110 THERAPEUTIC EXERCISES: CPT

## 2020-02-27 PROCEDURE — 97140 MANUAL THERAPY 1/> REGIONS: CPT

## 2020-02-27 NOTE — PROGRESS NOTES
OUTPATIENT PHYSICAL THERAPY: Daily Treatment Note 2/27/2020  Visit Count:  9    Patient has Neuro stimulator; NO BIOFEEDBACK or E-STIM     ICD-10: Treatment Diagnosis  Mixed incontinence (N39.46)   Other muscle spasm (L21.849)   Muscle weakness (generalized) (M62.81)   Other lack of coordination (R27.8)   Precautions/Allergies:   Sulfa (sulfonamide antibiotics); Celecoxib; and Pcn [penicillins]   TREATMENT PLAN:  Effective Dates: 12/19/2019 TO 3/18/2020 (90 days). Frequency/Duration: 1 time a week for 90 Day(s) MEDICAL/REFERRING DIAGNOSIS:  Full incontinence of feces [R15.9]   DATE OF ONSET: May 2017  REFERRING PHYSICIAN: Yue Hernández MD MD Orders: evaluate and treat  Return MD Appointment: March 2020     MEDICAL/REFERRING DIAGNOSIS:  Full incontinence of feces [R15.9]   REFERRING PHYSICIAN: Yue Hernández MD  Pre-treatment Symptoms/Complaints:  I have been much better this week and have been taking myself out of the stressful family issues. I can only get my water up to 32 oz because if I have more it makes my bowels diarrhea. Pain: Initial:   5 Post Session:  0/10   Medications Last Reviewed:  2/27/2020    Updated Objective Findings:  See evaluation note from initial visit and below for updated objective measures from treatment sessions  Very guarded with vaginal insertion  TTP IC,BC (B) and STP and L levator ani  Mulitple scar   TREATMENT:   THERAPEUTIC EXERCISE: (8 minutes):  Exercises per grid below to improve mobility, strength, and coordination. Required moderate visual, verbal, manual, and tactile cues to promote proper body alignment, promote proper body posture, promote proper body mechanics, and promote proper body breathing techniques. Progressed resistance, range, repetitions, and complexity of movement as indicated.     Exercises:  Patient instructed in pelvic floor exercises listed below:   Date:  12/19/19 Date:  12/26/19 Date:  1/9/20 Date  1/17/20 Date  1/23/20 Date  2/6/20 Date  2/13/20 Date  2/27/20   Activity/Exercise Parameters Parameters Parameters        Education HEP, POC, PT goals, PF anatomy/physiology, proper toileting and increase water for bladder health  PF relaxation continuation, stretches   Bladder health and urge suppression. PT provided skilled edu on bladder physiology and filling time and bladder triaining Pt educated on bowel scheduling and bowel sensitivity with adding too much of one thing all at one time. Pt to add one cup a week of water and compliment that with more fiber. PF connection breath 5 mins          PF drops 5x   5 MINS tactile and visual cues 5 mins tactile cues 5 mins tactile cues     Frog stretch 1 min          Happy baby 1 min 2 mins   DKTC 2 mins      Initial stretching #1 Handout provided and edu on stretches          Self scar mobilizations 2 mins-PT demonstrated          Hip flexors stretch EOB  2 mins ea  2 min ea       Prone Rec Fem stretch  2 mins ea         Fwd fold stretch  5x         Seated piriformis stretch  2 min  1 min       Prone press up abdominal stretching   3 mins        Cat/Cow   Unable due to discomfort  3 mins with tactile and visual cues      DKTC   2 mins    2 mins 3 mins   IR stretch    1 min ea       Belly breathing        2x5 with tactile cues and PF relaxation    Pelvic tilt seated     10x for pelvic and lumbar mobiltiy      Hip adductor stretch     30\" 3x easeated and standing      Kegel        10x with visual and tactile cues       MANUAL THERAPY: (45 minutes): Joint mobilization and Soft tissue mobilization was utilized and necessary because of the patient's restricted joint motion, painful spasm, loss of articular motion, and restricted motion of soft tissue.    (Used abbreviations: MET - muscle energy technique; SCS- Strain counter strain; CTM- Connective tissue mobilizations; CR- Contract/relax; SP- Sustained pressure, TrP- Trigger point release, IASTM- Instrument assisted soft tissue mobilizations, TDN- Trigger point dry needling). -abdominal massage and MFR to bladder  -DTP STRETCHING and release B   -glute and piriformis release (B) the R tighter than the L (NOT TODAY)  -lumbar AND thoracic paraspinal massage (NOT TODAY)  -OI AND B LA MUSCLE GROUP release    -THORACIC AND JOINT P/A JOINT MOBILIZATIONS GRADE III-IV (NOT TODAY)            Pt gives verbal and written consent to internal assessment/treatment; no chaperon present. [employer] Portal  The following educational topics were reviewed with patient:  pelvic floor anatomy/pathology and function related to her symptoms, water intake increase, bladder health tips, proper toileting position. Treatment/Session Summary:    · Response to Treatment:  Pt demonstrated increased PFM ROM again today with min to no guarding and pain. Very tender and restricted with R hip flexor and abdominal ROM due to scar tissue from previous surgeries but improved following deep tissue release. Pt responded well to trigger point release of DTP and B Levator ani muscles. · Communication/Consultation:  POC, PT goals, HEP , bowel scheduling and water/fiber relationship  · Equipment provided today:  HEP handout - urge suppression and bladder filling  · Recommendations/Intent for next treatment session: Next visit will focus on Manual interventions as indicated, progression of exercises/ROM as indicated. Total Treatment Billable Duration:  53 mins  PT Patient Time In/Time Out  Time In: 0958  Time Out: [address]    No future appointments.

## 2020-03-02 ENCOUNTER — HOSPITAL ENCOUNTER (OUTPATIENT)
Dept: PHYSICAL THERAPY | Age: 60
Discharge: HOME | End: 2020-03-02
Payer: COMMERCIAL

## 2020-03-02 PROCEDURE — 97110 THERAPEUTIC EXERCISES: CPT

## 2020-03-02 PROCEDURE — 97140 MANUAL THERAPY 1/> REGIONS: CPT

## 2020-03-02 NOTE — PROGRESS NOTES
OUTPATIENT PHYSICAL THERAPY: Daily Treatment Note 3/2/2020  Visit Count:  10    Patient has Neuro stimulator; NO BIOFEEDBACK or E-STIM     ICD-10: Treatment Diagnosis  Mixed incontinence (N39.46)   Other muscle spasm (O05.397)   Muscle weakness (generalized) (M62.81)   Other lack of coordination (R27.8)   Precautions/Allergies:   Sulfa (sulfonamide antibiotics); Celecoxib; and Pcn [penicillins]   TREATMENT PLAN:  Effective Dates: 12/19/2019 TO 3/18/2020 (90 days). Frequency/Duration: 1 time a week for 90 Day(s) MEDICAL/REFERRING DIAGNOSIS:  Full incontinence of feces [R15.9]   DATE OF ONSET: May 2017  REFERRING PHYSICIAN: Shama Suarez MD MD Orders: evaluate and treat  Return MD Appointment: March 2020     MEDICAL/REFERRING DIAGNOSIS:  Full incontinence of feces [R15.9]   REFERRING PHYSICIAN: Shama Suarez MD  Pre-treatment Symptoms/Complaints:  I have been much better with BM and less pain! No pain with intercourse and feels like it does prior to all my issues    Pain: Initial:   5 Post Session:  0/10   Medications Last Reviewed:  3/2/2020    Updated Objective Findings:  See evaluation note from initial visit and below for updated objective measures from treatment sessions  Very guarded with vaginal insertion  TTP IC,BC (B) and STP and L levator ani  Mulitple scar   TREATMENT:   THERAPEUTIC EXERCISE: (8 minutes):  Exercises per grid below to improve mobility, strength, and coordination. Required moderate visual, verbal, manual, and tactile cues to promote proper body alignment, promote proper body posture, promote proper body mechanics, and promote proper body breathing techniques. Progressed resistance, range, repetitions, and complexity of movement as indicated.     Exercises:  Patient instructed in pelvic floor exercises listed below:   Date:  12/19/19 Date:  12/26/19 Date  1/17/20 Date  1/23/20 Date  2/6/20 Date  2/13/20 Date  2/27/20 Date  3/2/20   Activity/Exercise Parameters Parameters Education HEP, POC, PT goals, PF anatomy/physiology, proper toileting and increase water for bladder health    Bladder health and urge suppression. PT provided skilled edu on bladder physiology and filling time and bladder triaining Pt educated on bowel scheduling and bowel sensitivity with adding too much of one thing all at one time. Pt to add one cup a week of water and compliment that with more fiber. PF connection breath 5 mins          PF drops 5x  5 MINS tactile and visual cues 5 mins tactile cues 5 mins tactile cues      Frog stretch 1 min       2 mins   Happy baby 1 min 2 mins  DKTC 2 mins       Initial stretching #1 Handout provided and edu on stretches          Self scar mobilizations 2 mins-PT demonstrated          Hip flexors stretch EOB  2 mins ea 2 min ea        Prone Rec Fem stretch  2 mins ea         Fwd fold stretch  5x         Seated piriformis stretch  2 min 1 min        Prone press up abdominal stretching           Cat/Cow    3 mins with tactile and visual cues       DKTC      2 mins 3 mins    IR stretch   1 min ea        Belly breathing       2x5 with tactile cues and PF relaxation     Pelvic tilt seated    10x for pelvic and lumbar mobiltiy       Hip adductor stretch    30\" 3x easeated and standing    1 min ea post manual   Kegel       10x with visual and tactile cues 6 with tactile cues       MANUAL THERAPY: (30 minutes): Joint mobilization and Soft tissue mobilization was utilized and necessary because of the patient's restricted joint motion, painful spasm, loss of articular motion, and restricted motion of soft tissue. (Used abbreviations: MET - muscle energy technique; SCS- Strain counter strain; CTM- Connective tissue mobilizations; CR- Contract/relax; SP- Sustained pressure, TrP- Trigger point release, IASTM- Instrument assisted soft tissue mobilizations, TDN- Trigger point dry needling).    -abdominal massage and MFR to bladder  -DTP STRETCHING and release B   -glute and piriformis release (B) the R tighter than the L (NOT TODAY)  -lumbar AND thoracic paraspinal massage (NOT TODAY)  -OI AND B LA MUSCLE GROUP release  (NOT TODAY)  -THORACIC AND JOINT P/A JOINT MOBILIZATIONS GRADE III-IV (NOT TODAY)  -B HIP ADDUCTOR MASSAGE/STRUMMING AND RELEASE            Pt gives verbal and written consent to internal assessment/treatment; no chaperon present. [employer]  The following educational topics were reviewed with patient:  pelvic floor anatomy/pathology and function related to her symptoms, water intake increase, bladder health tips, proper toileting position. Treatment/Session Summary:    · Response to Treatment:  Pt demonstrated increased PFM ROM again today with min to no guarding and pain. Minor tenderness at DTP L today that reduced with manual release/stretching and breathing. Pt doing well and to continue with HEP   · Communication/Consultation:  POC, PT goals, HEP , bowel scheduling and water/fiber relationship  · Equipment provided today:  HEP handout - urge suppression and bladder filling  · Recommendations/Intent for next treatment session: Next visit will focus on Manual interventions as indicated, progression of exercises/ROM as indicated.     Total Treatment Billable Duration:  38 mins  PT Patient Time In/Time Out  Time In: 0930  Time Out: [address]   Date Time Provider Yaya Mast   3/12/2020  9:00 AM Teressa Felder [facility]

## 2020-03-12 ENCOUNTER — HOSPITAL ENCOUNTER (OUTPATIENT)
Dept: PHYSICAL THERAPY | Age: 60
Discharge: HOME | End: 2020-03-12
Payer: COMMERCIAL

## 2020-03-12 PROCEDURE — 97140 MANUAL THERAPY 1/> REGIONS: CPT

## 2020-03-12 PROCEDURE — 97110 THERAPEUTIC EXERCISES: CPT

## 2020-03-12 NOTE — PROGRESS NOTES
OUTPATIENT PHYSICAL THERAPY: Daily Treatment Note 3/12/2020  Visit Count:  11    Patient has Neuro stimulator; NO BIOFEEDBACK or E-STIM     ICD-10: Treatment Diagnosis  Mixed incontinence (N39.46)   Other muscle spasm (K36.791)   Muscle weakness (generalized) (M62.81)   Other lack of coordination (R27.8)   Precautions/Allergies:   Sulfa (sulfonamide antibiotics); Celecoxib; and Pcn [penicillins]   TREATMENT PLAN:  Effective Dates: 12/19/2019 TO 6/15/2020 (180 days). Frequency/Duration: 1 time a week for 180 Day(s) MEDICAL/REFERRING DIAGNOSIS:  Full incontinence of feces [R15.9]   DATE OF ONSET: May 2017  REFERRING PHYSICIAN: Veda Andrews MD MD Orders: evaluate and treat  Return MD Appointment: March 2020     MEDICAL/REFERRING DIAGNOSIS:  Full incontinence of feces [R15.9]   REFERRING PHYSICIAN: Veda Andrews MD  Pre-treatment Symptoms/Complaints:  I am doing well. I am still at 32 oz water a day and its going well. I cannot do more than that because then I get diahhrea. I am able to hold my urine when I walk calmly and breathe getting there vs trying to walk fast and panicking with no leaking! Pain: Initial:   5 Post Session:  0/10   Medications Last Reviewed:  3/12/2020    Updated Objective Findings:  See re-evaluation/re-cert note from initial visit and below for updated objective measures from treatment sessions  Improved relaxation  TTP IC,BC (B) and STP and R levator ani  Mulitple scar  R Oscar  tightness   TREATMENT:   THERAPEUTIC EXERCISE: (15 minutes):  Exercises per grid below to improve mobility, strength, and coordination. Required moderate visual, verbal, manual, and tactile cues to promote proper body alignment, promote proper body posture, promote proper body mechanics, and promote proper body breathing techniques. Progressed resistance, range, repetitions, and complexity of movement as indicated.     Exercises:  Patient instructed in pelvic floor exercises listed below: Date:  12/19/19 Date:  12/26/19 Date  1/23/20 Date  2/6/20 Date  2/13/20 Date  2/27/20 Date  3/2/20 Date  3/12/20   Activity/Exercise Parameters Parameters         Education HEP, POC, PT goals, PF anatomy/physiology, proper toileting and increase water for bladder health   Bladder health and urge suppression. PT provided skilled edu on bladder physiology and filling time and bladder triaining Pt educated on bowel scheduling and bowel sensitivity with adding too much of one thing all at one time. Pt to add one cup a week of water and compliment that with more fiber. Re-eval- Education on progress made and HEP review   PF connection breath 5 mins       6 mins   PF drops 5x  5 mins tactile cues 5 mins tactile cues       Frog stretch 1 min      2 mins    Happy baby 1 min 2 mins DKTC 2 mins        Initial stretching #1 Handout provided and edu on stretches          Self scar mobilizations 2 mins-PT demonstrated          Hip flexors stretch EOB  2 mins ea         Prone Rec Fem stretch  2 mins ea         Fwd fold stretch  5x         Seated piriformis stretch  2 min         Prone press up abdominal stretching           Cat/Cow   3 mins with tactile and visual cues        DKTC     2 mins 3 mins     IR stretch           Belly breathing      2x5 with tactile cues and PF relaxation      Pelvic tilt seated   10x for pelvic and lumbar mobiltiy        Hip adductor stretch   30\" 3x easeated and standing    1 min ea post manual    Kegel      10x with visual and tactile cues 6 with tactile cues 5 mins tactile cues for testing strength and improving coordination       MANUAL THERAPY: (45 minutes): Joint mobilization and Soft tissue mobilization was utilized and necessary because of the patient's restricted joint motion, painful spasm, loss of articular motion, and restricted motion of soft tissue.    (Used abbreviations: MET - muscle energy technique; SCS- Strain counter strain; CTM- Connective tissue mobilizations; CR- Contract/relax; SP- Sustained pressure, TrP- Trigger point release, IASTM- Instrument assisted soft tissue mobilizations, TDN- Trigger point dry needling). -abdominal massage and MFR to bladder  -DTP STRETCHING and release B   -glute and piriformis release (B) the R tighter than the L (NOT TODAY)  -lumbar AND thoracic paraspinal massage (NOT TODAY)  -OI AND B LA MUSCLE GROUP release  -THORACIC AND JOINT P/A JOINT MOBILIZATIONS GRADE III-IV (NOT TODAY)  -B HIP ADDUCTOR MASSAGE/STRUMMING AND RELEASE (not today)            Pt gives verbal and written consent to internal assessment/treatment; no chaperon present. [employer] Portal  The following educational topics were reviewed with patient:  pelvic floor anatomy/pathology and function related to her symptoms, water intake increase, bladder health tips, proper toileting position. Treatment/Session Summary:    · Response to Treatment:  Pt demonstrated increased PFM ROM again today with min to no guarding but increased LA muscle group pain that reduced from 6/10 to 2/10 at end of session. Pt progressing well. See re-cert for full details   · Communication/Consultation:  POC, PT goals, HEP , bowel scheduling and water/fiber relationship  · Equipment provided today:  HEP handout  · Recommendations/Intent for next treatment session: Next visit will focus on Manual interventions as indicated, progression of exercises/ROM as indicated.     Total Treatment Billable Duration:  60 mins  PT Patient Time In/Time Out  Time In: 0848  Time Out: [address] [facility] [address]    Future Appointments   Date Time Provider Yaya Mast   3/20/2020 11:00 AM [facility]   3/24/2020 11:00 AM [facility]   4/2/2020 10:00 AM [facility]   4/9/2020  9:00 AM [facility] Grange [facility]   4/14/2020 10:00 AM [facility] Gr[facility]   4/23/2020  9:00 AM [facility] Gr[facility]   4/30/2020  9:00 AM [facility]

## 2020-03-12 NOTE — THERAPY RECERTIFICATION
Aaron Pete  : 1960  Primary: [facility]  Secondary:  [facility] [place] [address], [address]  [phone]   [phone]          OUTPATIENT PHYSICAL THERAPY:Re-evaluation and Recertification 1130   ICD-10: Treatment Diagnosis  Mixed incontinence (N39.46)   Other muscle spasm (M37.957)   Muscle weakness (generalized) (M62.81)   Other lack of coordination (R27.8)   Precautions/Allergies:   Sulfa (sulfonamide antibiotics); Celecoxib; and Pcn [penicillins]   TREATMENT PLAN:  Effective Dates: 2019 TO 6/15/2020 (180 days). Frequency/Duration: 1 time a week for 180 Day(s)  MEDICAL/REFERRING DIAGNOSIS:  Full incontinence of feces [R15.9]   DATE OF ONSET: May 2017  REFERRING PHYSICIAN: Shama Suarez MD MD Orders: evaluate and treat  Return MD Appointment: 8036       Re-certification: Aaron Pete has attended 11 PT sessions including eval. She has demonstrated improved PF muscle ROM, flexibility and diaphragmatic breathing. However she continues to have minor pain with intercourse, and leakage with lifting and urgency, and decreased vaginal vault size and pain with speculum and rectal examinations. Pt continues to have pain and tenderness at (B) LA muscle group and tightness of STP (B) and benjamin tightness (R) and limited lumbar and hip ROM. Pt will continue to  benefit from manual interventions (soft tissue mobilizations, joint mobilizations, TDN, MET,etc.) as indicated, therapeutic exercises/activities as indicated, and biofeedback for neuromuscular re-education to improve patient's quality of life and functional mobility. INITIAL ASSESSMENT:  Ms. Jayjay Rajput presents to physical therapy with c/o pain with vaginal insertion during intercourse and speculum and rectal examinations, urine and fecal leakage with lifting, bending, and post intercourse.  She presents with decreased vaginal vault size, muscle guarding with entry into vagina with internal PF assessment, tenderness at Ischiocavernosus and bulbocavernosus (B) and referral of L hip pain with palpation of L levator ani muscles. Pt also presents with multiple scars with decreased mobility, pain along anterior pelvis and pubic bone. She lack PFM coordination and strength due to muscle guarding and tightness. These S/S are indicative of Mixed urinary incontinence, fecal incontinence, decreased coordination and strength and muscle spasms. Patient will benefit from manual interventions (soft tissue mobilizations, joint mobilizations, TDN, MET,etc.) as indicated, therapeutic exercises/activities as indicated, and biofeedback for neuromuscular re-education to improve patient's quality of life and functional mobility. PROBLEM LIST (Impacting functional limitations):  1. Decreased Strength  2. Decreased ADL/Functional Activities  3. Decreased Transfer Abilities  4. Decreased Ambulation Ability/Technique  5. Increased Pain  6. Decreased Activity Tolerance  7. Increased Fatigue  8. Increased Shortness of Breath  9. Decreased Flexibility/Joint Mobility  10. Decreased Skin Integrity/Hygeine  11. Decreased [place] with Home Exercise Program INTERVENTIONS PLANNED:  1. Balance Exercise  2. Bed Mobility  3. Cold  4. Electrical Stimulation  5. Family Education  6. Gait Training  7. Heat  8. Home Exercise Program (HEP)  9. Manual Therapy  10. Neuromuscular Re-education/Strengthening  11. Range of Motion (ROM)  12. Therapeutic Activites  13. Therapeutic Exercise/Strengthening  14. Transcutaneous Electrical Nerve Stimulation (TENS)  15. Transfer Training  16. Ultrasound (US)     GOALS: (Goals have been discussed and agreed upon with patient.)  Short-Term Functional Goals: Time Frame: 6 weeks  1. Patient will be independent and compliant with HEP (met 3/12/20)  2.  Patient will tolerate manual interventions (joint mobilizations, STM, TDN, trigger point release, PROM, etc.) to improve joint ROM/soft tissue mobility of restricted structures to improve functional mobility. (met 3/12/20)  3. Patient will report <3/10 pain with functional ADLs and vaginal insertion to have annual speculum and rectal examinations done to improve quality of life (met 3/12/20)  Discharge Goals: Time Frame: 12 weeks  1. Pt will be independent with comprehensive HEP (met 3/12/20)  2. Pt will demonstrate proper lifting mechanics to lift 10# from floor to stand 10x with min to no pain or compensation or leaking of urine of feces to demonstrate improved pressure control system for pelvic floor health. (ongoing able to do 8 with no leaking)  3. Pt will report <1-2/10 pain with functional ADLs and vaginal insertion to have annual speculum and rectal examinations done  for improved mobility. 4. Pt will report increase water intake to >60 oz for good bladder health (not met due to pt unable to drink >32oz without diarrhea due to her PMH)    Outcome Measure: Tool Used: Pelvic Floor Disability Index (PFDI-20)  Score:  Initial: 32 Most Recent: 25 (Date: 3-12-20 )   Interpretation of Score: This survey asks questions concerning certain  bowel, bladder, or pelvic symptoms and how much this symptoms interfere with daily activiites. Each section is scored on a 0-4 scale, 4 representing the greatest disability. The scores of each section are added together for a total score out of 70. Medical Necessity:   · Patient continues to have deficits in pelvic floor strength, range of motion and coordination causing continued pain and urinary and fecal leakage.  Pt will continue to require skilled interventions due to these deficits to improve daily activities to improve quality of life    Reason for Services/Other Comments:  · Patient continues to require skilled intervention due to above mentioned deficits    Total Duration: 60 mins  PT Patient Time In/Time Out  Time In: 6195    Rehabilitation Potential For Stated Goals: [address] [facility] therapy, I certify that the treatment plan above will be carried out by a therapist or under their direction. Thank you for this referral,  Kevin Desir     Referring Physician Signature: Kayla Montana MD              Date                    PAIN/SUBJECTIVE:   Initial:   2/10 Post Session:  3/10     HISTORY:   History of Present Injury/Illness (Reason for Referral):  Addie Rolle states she was dx with rectal cancer in May 2017 and had a colostomy bag until June 2018. She was still dealing with fecal incontinence she had a device placed May 2019 in her back to help her bulk up her stool and hold it to get to the bathroom; NEUROSTIMULATOR ELECTRODE ARRAY;SACRAL NERVE (TRANSFORAMINAL PLACEMENT). She then went to see a nutritionist to help her bulk her stool. Urinary: Frequency 10 x/day, 1 x/night.                       -Positive for stress urinary incontinence (ABIGAIL), urge urinary incontinence (UUI), mixed urinary incontinence (VALERY), urgency, frequency                     -Pt uses/does not use pads for protection; 1-3/4 pads per day (PPD)                     -Denies , incomplete emptying, urinary hesitancy, dysuria, hematuria, nocturia. Fluid intake: 16 oz water/day; bladder irritants include: 2 cups Coffee, 1-2 gatorade, 1-2 cans of tea    Bowel: Frequency 3x/day. -Positive for pushing/straining with BM, incomplete emptying, fecal incontinence.                      -Negative for pain with bowel movement (BM), pushing/straining with BM, incomplete emptying, fecal incontinence, constipation.                       -[place] stool type: 6-7 but mostly 4-5                      -Use of stool softeners or laxatives? No-uses -Diphen/atrop 2.5 mph1x/aday and 1 Fibercon a day    Sexual: Pt is sexually active. -Male partners.                    -Positive for dyspareunia. -went to the [facility] and they recommended a different lubricant (water based) and vibrator which has seemed to help with the lubrication better.  -A couple of times I have had bowel movements after sex and its embarrassing for me.     Jorge Fournier Pain: Location: anterior pelvic area and L anterior groin.                         -Worse with sex. -Relieved with a few minutes after sex. -Max pain 6-7/10. Min pain 0/10. Past Medical History/Comorbidities:   Ms. Vanessa Rendon  has a past medical history of Chronic pain. She also has no past medical history of Arrhythmia, Arthritis, Asthma, Autoimmune disease ([address] CAD (coronary artery disease), Cancer ([address] Chronic kidney disease, Coagulation defects, COPD, Diabetes ([address] Difficult intubation, GERD (gastroesophageal reflux disease), Heart failure ([address] Hypertension, Liver disease, Malignant hyperthermia due to anesthesia, Nausea & vomiting, Other ill-defined conditions(799.89), Pseudocholinesterase deficiency, Psychiatric disorder, PUD (peptic ulcer disease), Seizures ([address] Stroke ([address] Thromboembolus ([address] Thyroid disease, Unspecified adverse effect of anesthesia, or Unspecified sleep apnea. Ms. Vanessa Rendon  has a past surgical history that includes hx heent and hx  section. NEUROSTIMULATOR ELECTRODE ARRAY;SACRAL NERVE (TRANSFORAMINAL PLACEMENT) (May 2019) and Rectal surgical removal of cancer (2017)    Social History/Living Environment:    Lives with   Have you ever had any pelvic trauma (orthopedic in nature, fall, MVA, etc.)? no  Have you ever experienced any unwanted physical or sexual contact? no  Have you ever experienced any form of medical trauma (GYN, urological, GI, etc)?  -3 vaginal births and 1  due to breach birth    Prior Level of Function/Work/Activity:   lifting, moving around       Ambulatory/Rehab Services H2 Model Falls Risk Assessment    Risk Factors:       No Risk Factors Identified Ability to Rise from Chair:       (0)  Ability to rise in a single movement    Falls Prevention Plan:       No modifications necessary   Total: (5 or greater = High Risk): 0    ©2010 [facility] Patent #[id number]. Federal Law prohibits the replication, distribution or use without written permission from [facility] [employer]     Current Medications:       Current Outpatient Medications:     mirabegron ER (MYRBETRIQ) 50 mg ER tablet, Take 50 mg by mouth daily. , Disp: , Rfl:     MYRBETRIQ 50 mg ER tablet, Take 1 Tab by mouth daily. , Disp: 30 Tab, Rfl: 11   Date Last Reviewed:  3/12/2020   Number of Personal Factors/Comorbidities that affect the Plan of Care: 3+: HIGH COMPLEXITY   EXAMINATION:   OBSERVATION:   External Observation:   · Voluntary Contraction: absent  · Voluntary Relaxation: Fair  · Involuntary Contraction: minimal  · Involuntary Relaxation: good  · Perineal Body Assessment: WFL  · Anal [place]: absent  · Skin Integrity: good  · Vaginal Vault Size: decreased    PALPATION: -unable to assess further into layer 2 of PFM due to pain and muscle guarding    Superficial Pelvic Floor Musculature (PFM): Tender? Intermediate PFM Tender? Deep PFM Tender? External Palpation Tender? R Superficial Transverse Perineal Y R Deep Transverse Perineal  R. Puborectalis  Abdominals Y   L Superficial Transverse Perineal Y L Deep Transverse Perineal  L. Puborectalis Y R/L hip adductors Y   R Ischiocavernosus  R Compressor Urethra  R. Pubococcygeus  R/L Iliacus/psoas Y   L Ischiocavernosus  L Compressor Urethra  L. Pubococcygeus  R/L Glutes     R Bulbocavernosus    R. Ileococcygeus Y R/L Piriformis Y   L Bulbocavernosus    L. Ileococcygeus Y         R. Obturator Internus Y         L. Obturator Internus          R. Coccygeus Y         L.  Coccygeus Y       RANGE OF MOTION:  Pain in abdomen with end-range hip flexion B and hip abduction due to hip adductor tightness    STRENGTH:  P: Power, E: Endurance, R: Repetitions, QF: Quick Flicks, TrA: Transverse Abdominus  P 1   E 1   R 1   QF    TrA improved     COORDINATION:  Diaphragmatic breathing (DB): good    SPECIAL TESTS:  Q-tip test: nt  Supine cough test: neg  POP-Q: (Pelvic Organ Prolapse - Quantification Exam) per MD note: none ntoed    ·      Body Structures Involved:  1. Nerves  2. Thoracic Cage  3. Digestive Structures  4. Bones  5. Joints  6. Muscles  7. Ligaments Body Functions Affected:  1. Sensory/Pain  2. Respiratory  3. Genitourinary  4. Neuromusculoskeletal  5. Movement Related  6. Digestive Activities and Participation Affected:  1. Learning and Applying Knowledge  2. General Tasks and Demands  3. Communication  4.  Mobility   Number of elements (examined above) that affect the Plan of Care: 3: MODERATE COMPLEXITY   CLINICAL PRESENTATION:   Presentation: Evolving clinical presentation with changing clinical characteristics: MODERATE COMPLEXITY   CLINICAL DECISION MAKING:   Use of outcome tool(s) and clinical judgement create a POC that gives a: Questionable prediction of patient's progress: MODERATE COMPLEXITY        Kimberly Correa PT, DPT

## 2020-05-14 ENCOUNTER — HOSPITAL ENCOUNTER (OUTPATIENT)
Dept: PHYSICAL THERAPY | Age: 60
Discharge: HOME | End: 2020-05-14
Payer: COMMERCIAL

## 2020-05-14 PROCEDURE — 97140 MANUAL THERAPY 1/> REGIONS: CPT

## 2020-05-14 PROCEDURE — 97110 THERAPEUTIC EXERCISES: CPT

## 2020-05-14 PROCEDURE — 97164 PT RE-EVAL EST PLAN CARE: CPT

## 2020-05-14 NOTE — PROGRESS NOTES
OUTPATIENT PHYSICAL THERAPY: Daily Treatment Note 5/14/2020  Visit Count:  12    Patient has Neuro stimulator; NO BIOFEEDBACK or E-STIM     ICD-10: Treatment Diagnosis  Mixed incontinence (N39.46)   Other muscle spasm (J93.606)   Muscle weakness (generalized) (M62.81)   Other lack of coordination (R27.8)   Precautions/Allergies:   Sulfa (sulfonamide antibiotics); Celecoxib; and Pcn [penicillins]   TREATMENT PLAN:  Effective Dates: 5/14/20 TO 8/12/2020 (90 days). Frequency/Duration: 1 time a week for 90 Day(s)  MEDICAL/REFERRING DIAGNOSIS:  Full incontinence of feces [R15.9]   DATE OF ONSET: May 2017  REFERRING PHYSICIAN: Katya Fink MD MD Orders: evaluate and treat  Return MD Appointment: March 2020     MEDICAL/REFERRING DIAGNOSIS:  Full incontinence of feces   REFERRING PHYSICIAN: Katya Fink MD  Pre-treatment Symptoms/Complaints:  I have been doing pretty good. I did over do drinking tea yesterday with sugar in it and that messed with my system. I am still drinking 32oz of water and doing the exercises but I have tightness in my abdomen. I am still leaking a little bit because I am not paying attention and rushing to lift things at work and leak then and also with sneezing sometimes. Bowels have been doing ok especially since I upped my device a bit, but I found I cannot have a whole lot of sweet tea. I notice the abdominal pain more when I have done too much activity. Still less pain with intercourse. Pain: Initial:   5 Post Session:  0/10   Medications Last Reviewed:  5/14/2020    Updated Objective Findings:  See re-evaluation/re-cert note from initial visit and below for updated objective measures from treatment sessions  Improved relaxation  TTP IC,BC (B) and STP and R levator ani  Mulitple scar  R Oscar  tightness   TREATMENT:   THERAPEUTIC EXERCISE: (15 minutes):  Exercises per grid below to improve mobility, strength, and coordination.   Required moderate visual, verbal, manual, and tactile cues to promote proper body alignment, promote proper body posture, promote proper body mechanics, and promote proper body breathing techniques. Progressed resistance, range, repetitions, and complexity of movement as indicated. Exercises:  Patient instructed in pelvic floor exercises listed below:   Date:  12/19/19 Date:  12/26/19 Date  2/6/20 Date  2/13/20 Date  2/27/20 Date  3/2/20 Date  3/12/20 Date  5/14/20   Activity/Exercise Parameters Parameters         Education HEP, POC, PT goals, PF anatomy/physiology, proper toileting and increase water for bladder health  Bladder health and urge suppression. PT provided skilled edu on bladder physiology and filling time and bladder triaining Pt educated on bowel scheduling and bowel sensitivity with adding too much of one thing all at one time. Pt to add one cup a week of water and compliment that with more fiber.    Re-eval- Education on progress made and HEP review HEP review and physiology review of PF tightness   PF connection breath 5 mins      6 mins    PF drops 5x  5 mins tactile cues     With belly breathing 6 mins   Frog stretch 1 min     2 mins     Happy baby 1 min 2 mins         Initial stretching #1 Handout provided and edu on stretches          Self scar mobilizations 2 mins-PT demonstrated          Hip flexors stretch EOB  2 mins ea         Prone Rec Fem stretch  2 mins ea         Fwd fold stretch  5x      Full body flexion to OH ext stretch 3 mins   Seated piriformis stretch  2 min         Prone press up abdominal stretching           Cat/Cow           DKTC    2 mins 3 mins      IR stretch           Belly breathing     2x5 with tactile cues and PF relaxation    2 mins   Pelvic tilt seated           Hip adductor stretch      1 min ea post manual     Kegel     10x with visual and tactile cues 6 with tactile cues 5 mins tactile cues for testing strength and improving coordination        MANUAL THERAPY: (30 minutes): Joint mobilization and Soft tissue mobilization was utilized and necessary because of the patient's restricted joint motion, painful spasm, loss of articular motion, and restricted motion of soft tissue. (Used abbreviations: MET - muscle energy technique; SCS- Strain counter strain; CTM- Connective tissue mobilizations; CR- Contract/relax; SP- Sustained pressure, TrP- Trigger point release, IASTM- Instrument assisted soft tissue mobilizations, TDN- Trigger point dry needling). -abdominal massage and MFR to bladder  -DTP STRETCHING and LA muscles release B via vaginal canal  -glute and piriformis release (B) the R tighter than the L (NOT TODAY)  -lumbar AND thoracic paraspinal massage  -OI AND B LA MUSCLE GROUP release  -THORACIC AND JOINT P/A JOINT MOBILIZATIONS GRADE III-IV   -B HIP ADDUCTOR MASSAGE/STRUMMING AND RELEASE (not today)            Pt gives verbal and written consent to internal assessment/treatment; no chaperon present. [facility] Portal  The following educational topics were reviewed with patient:  pelvic floor anatomy/pathology and function related to her symptoms, water intake increase, bladder health tips, proper toileting position. Treatment/Session Summary:    · Response to Treatment:  Pt demonstrated increased PFM ROM and decreased pain from initial eval with min to no guarding but increased LA muscle group pain that reduced from 5/10 to 0/10 at end of session. Pt progressing well. See re-cert for full details  · Communication/Consultation:  POC, PT goals, HEP , bowel scheduling and water/fiber relationship  · Equipment provided today:  HEP handout  · Recommendations/Intent for next treatment session: Next visit will focus on Manual interventions as indicated, progression of exercises/ROM as indicated.     Total Treatment Billable Duration:  55 mins; 30 manual; 15 therex, 10 re-cert  PT Patient Time In/Time Out  Time In: 0845  Time Out: [address]    Future Appointments   Date Time Provider Yaya Mast 5/21/2020  9:00 AM Meena Diamond N [facility]   5/28/2020  9:00 AM Meena Diamond N [facility]   6/4/2020  9:00 AM Meena Diamond [facility]   6/11/2020  9:00 AM Meena Diamond [facility]   6/18/2020  9:00 AM Meena Diamond [facility]   6/25/2020  9:00 AM Ariel Alcantara [facility]

## 2020-05-14 NOTE — THERAPY RECERTIFICATION
Paola Arevalo  : 1960  Primary: Glorya Humberto [facility]  Secondary:  [facility] [address]  [address]. [place] [address], [address]  [phone]   [phone]          OUTPATIENT PHYSICAL THERAPY:Re-evaluation and Recertification    ICD-10: Treatment Diagnosis  Mixed incontinence (N39.46)   Other muscle spasm (G65.261)   Muscle weakness (generalized) (M62.81)   Other lack of coordination (R27.8)   Precautions/Allergies:   Sulfa (sulfonamide antibiotics); Celecoxib; and Pcn [penicillins]   TREATMENT PLAN:  Effective Dates: 20 TO 2020 (90 days). Frequency/Duration: 1 time a week for 90 Day(s)  MEDICAL/REFERRING DIAGNOSIS:  Full incontinence of feces   DATE OF ONSET: May 2017  REFERRING PHYSICIAN: Roxane Hewitt MD MD Orders: evaluate and treat  Return MD Appointment: [id number]       Re-certification: Paola Arevalo has attended 12 PT sessions including eval. She returns to PT after COVID-19 clinic closure with reports of tightness and some abdominal and lowerback pain that has begun during the quarantine and she continues to have some ABIGAIL with lifting and sneezing. She has demonstrated improved PF muscle ROM, flexibility and diaphragmatic breathing. However she continues to have minor pain with intercourse, and leakage with lifting and urgency, and decreased vaginal vault size and pain with speculum and rectal examinations and low back and pelvic pain. Pt continues to have pain and tenderness at (B) LA muscle group and tightness of DTP (B) and benjamin tightness (B) and abdominal trigger points and limited lumbar paraspinal soft tissue mobility and hip ROM.  Pt will continue to  benefit from manual interventions (soft tissue mobilizations, joint mobilizations, TDN, MET,etc.) as indicated, therapeutic exercises/activities as indicated, and biofeedback for neuromuscular re-education to improve patient's quality of life and functional mobility. INITIAL ASSESSMENT:  Ms. Zara Dorsey presents to physical therapy with c/o pain with vaginal insertion during intercourse and speculum and rectal examinations, urine and fecal leakage with lifting, bending, and post intercourse. She presents with decreased vaginal vault size, muscle guarding with entry into vagina with internal PF assessment, tenderness at Ischiocavernosus and bulbocavernosus (B) and referral of L hip pain with palpation of L levator ani muscles. Pt also presents with multiple scars with decreased mobility, pain along anterior pelvis and pubic bone. She lack PFM coordination and strength due to muscle guarding and tightness. These S/S are indicative of Mixed urinary incontinence, fecal incontinence, decreased coordination and strength and muscle spasms. Patient will benefit from manual interventions (soft tissue mobilizations, joint mobilizations, TDN, MET,etc.) as indicated, therapeutic exercises/activities as indicated, and biofeedback for neuromuscular re-education to improve patient's quality of life and functional mobility. PROBLEM LIST (Impacting functional limitations):  1. Decreased Strength  2. Decreased ADL/Functional Activities  3. Decreased Transfer Abilities  4. Decreased Ambulation Ability/Technique  5. Increased Pain  6. Decreased Activity Tolerance  7. Increased Fatigue  8. Increased Shortness of Breath  9. Decreased Flexibility/Joint Mobility  10. Decreased Skin Integrity/Hygeine  11. Decreased [place] with Home Exercise Program INTERVENTIONS PLANNED:  1. Balance Exercise  2. Bed Mobility  3. Cold  4. Electrical Stimulation  5. Family Education  6. Gait Training  7. Heat  8. Home Exercise Program (HEP)  9. Manual Therapy  10. Neuromuscular Re-education/Strengthening  11. Range of Motion (ROM)  12. Therapeutic Activites  13. Therapeutic Exercise/Strengthening  14. Transcutaneous Electrical Nerve Stimulation (TENS)  15. Transfer Training  16.  Ultrasound (US) GOALS: (Goals have been discussed and agreed upon with patient.)  Short-Term Functional Goals: Time Frame: 6 weeks  1. Patient will be independent and compliant with HEP (met 3/12/20)  2. Patient will tolerate manual interventions (joint mobilizations, STM, TDN, trigger point release, PROM, etc.) to improve joint ROM/soft tissue mobility of restricted structures to improve functional mobility. (met 3/12/20)  3. Patient will report <3/10 pain with functional ADLs and vaginal insertion to have annual speculum and rectal examinations done to improve quality of life (met 3/12/20)  Discharge Goals: Time Frame: 12 weeks  1. Pt will be independent with comprehensive HEP (met 3/12/20)  2. Pt will demonstrate proper lifting mechanics to lift 10# from floor to stand 10x with min to no pain or compensation or leaking of urine of feces to demonstrate improved pressure control system for pelvic floor health. (ongoing able to do 8 with no leaking)  3. Pt will report <1-2/10 pain with functional ADLs and vaginal insertion to have annual speculum and rectal examinations done  for improved mobility. 4. Pt will report increase water intake to >60 oz for good bladder health (not met due to pt unable to drink >32oz without diarrhea due to her PMH)    Outcome Measure: Tool Used: Pelvic Floor Disability Index (PFDI-20)  Score:  Initial: 32 Most Recent: 25 (Date: 3-12-20 )   Interpretation of Score: This survey asks questions concerning certain  bowel, bladder, or pelvic symptoms and how much this symptoms interfere with daily activiites. Each section is scored on a 0-4 scale, 4 representing the greatest disability. The scores of each section are added together for a total score out of 70. Medical Necessity:   · Patient continues to have deficits in pelvic floor strength, range of motion and coordination causing continued pain and urinary and fecal leakage.  Pt will continue to require skilled interventions due to these deficits to improve daily activities to improve quality of life    Reason for Services/Other Comments:  · Patient continues to require skilled intervention due to above mentioned deficits    Total Duration: 30 manual, 15 therex, 10 re-cert; 55 mins   PT Patient Time In/Time Out  Time In: 0845  Time Out: 0943    Rehabilitation Potential For Stated Goals: [address] PACKER Parvinshawna's therapy, I certify that the treatment plan above will be carried out by a therapist or under their direction. Thank you for this referral,  Hazel Jay     Referring Physician Signature: Sherry Mendoza MD              Date                    PAIN/SUBJECTIVE:   Initial:   2/10 Post Session:  3/10     HISTORY:   History of Present Injury/Illness (Reason for Referral):  Rebecca Garvey states she was dx with rectal cancer in May 2017 and had a colostomy bag until June 2018. She was still dealing with fecal incontinence she had a device placed May 2019 in her back to help her bulk up her stool and hold it to get to the bathroom; NEUROSTIMULATOR ELECTRODE ARRAY;SACRAL NERVE (TRANSFORAMINAL PLACEMENT). She then went to see a nutritionist to help her bulk her stool. Urinary: Frequency 10 x/day, 1 x/night.                       -Positive for stress urinary incontinence (ABIGAIL), urge urinary incontinence (UUI), mixed urinary incontinence (VALERY), urgency, frequency                     -Pt uses/does not use pads for protection; 1-3/4 pads per day (PPD)                     -Denies , incomplete emptying, urinary hesitancy, dysuria, hematuria, nocturia. Fluid intake: 16 oz water/day; bladder irritants include: 2 cups Coffee, 1-2 gatorade, 1-2 cans of tea    Bowel: Frequency 3x/day. -Positive for pushing/straining with BM, incomplete emptying, fecal incontinence.                      -Negative for pain with bowel movement (BM), pushing/straining with BM, incomplete emptying, fecal incontinence, constipation. -[place] stool type: 6-7 but mostly 4-5                      -Use of stool softeners or laxatives? No-uses -Diphen/atrop 2.5 mph1x/aday and 1 Fibercon a day    Sexual: Pt is sexually active. -Male partners.                    -Positive for dyspareunia. -went to the [facility] and they recommended a different lubricant (water based) and vibrator which has seemed to help with the lubrication better.  -A couple of times I have had bowel movements after sex and its embarrassing for me.     Gatha Bellow Pain: Location: anterior pelvic area and L anterior groin.                         -Worse with sex. -Relieved with a few minutes after sex. -Max pain 6-7/10. Min pain 0/10. Past Medical History/Comorbidities:   Ms. Rosamaria Whalen  has a past medical history of Chronic pain. She also has no past medical history of Arrhythmia, Arthritis, Asthma, Autoimmune disease ([address] CAD (coronary artery disease), Cancer ([address] Chronic kidney disease, Coagulation defects, COPD, Diabetes ([address] Difficult intubation, GERD (gastroesophageal reflux disease), Heart failure ([address] Hypertension, Liver disease, Malignant hyperthermia due to anesthesia, Nausea & vomiting, Other ill-defined conditions(799.89), Pseudocholinesterase deficiency, Psychiatric disorder, PUD (peptic ulcer disease), Seizures ([address] Stroke ([address] Thromboembolus ([address] Thyroid disease, Unspecified adverse effect of anesthesia, or Unspecified sleep apnea. Ms. Rosamaria Whalen  has a past surgical history that includes hx heent and hx  section.  NEUROSTIMULATOR ELECTRODE ARRAY;SACRAL NERVE (TRANSFORAMINAL PLACEMENT) (May 2019) and Rectal surgical removal of cancer (2017)    Social History/Living Environment:    Lives with   Have you ever had any pelvic trauma (orthopedic in nature, fall, MVA, etc.)? no  Have you ever experienced any unwanted physical or sexual contact? no  Have you ever experienced any form of medical trauma (GYN, urological, GI, etc)? -3 vaginal births and 1  due to breach birth    Prior Level of Function/Work/Activity:   lifting, moving around       Ambulatory/Rehab Services H2 Model Falls Risk Assessment    Risk Factors:       No Risk Factors Identified Ability to Rise from Chair:       (0)  Ability to rise in a single movement    Falls Prevention Plan:       No modifications necessary   Total: (5 or greater = High Risk): 0     [facility]. All Rights Reserved. [facility] Patent #[id number]. Federal Law prohibits the replication, distribution or use without written permission from [facility] [employer]     Current Medications:       Current Outpatient Medications:     mirabegron ER (MYRBETRIQ) 50 mg ER tablet, Take 50 mg by mouth daily. , Disp: , Rfl:     MYRBETRIQ 50 mg ER tablet, Take 1 Tab by mouth daily. , Disp: 30 Tab, Rfl: 11   Date Last Reviewed:  2020   Number of Personal Factors/Comorbidities that affect the Plan of Care: 3+: HIGH COMPLEXITY   EXAMINATION:   OBSERVATION: Lumbar ROM WFL- slight abdominal discomfort going from flexion to extension  Tight lumbar paraspinals limiting lumbar flexion  External Observation:   · Voluntary Contraction: poor-very poor coordination  · Voluntary Relaxation: Fair  · Involuntary Contraction: minimal  · Involuntary Relaxation: good  · Perineal Body Assessment: WFL  · Anal [place]: absent  · Skin Integrity: good  · Vaginal Vault Size: decreased    PALPATION: -unable to assess further into layer 2 of PFM due to pain and muscle guarding    Superficial Pelvic Floor Musculature (PFM): Tender? Intermediate PFM Tender? Deep PFM Tender? External Palpation Tender?    R Superficial Transverse Perineal  R Deep Transverse Perineal Y R. Puborectalis  Abdominals Y   L Superficial Transverse Perineal  L Deep Transverse Perineal Y L. Puborectalis Y R/L hip adductors Y   R Ischiocavernosus  R Compressor Urethra  R. Pubococcygeus  R/L Iliacus/psoas Y   L Ischiocavernosus  L Compressor Urethra  L. Pubococcygeus  R/L Glutes     R Bulbocavernosus    R. Ileococcygeus Y R/L Piriformis Y   L Bulbocavernosus    L. Ileococcygeus Y         R. Obturator Internus Y         L. Obturator Internus          R. Coccygeus Y         L. Coccygeus Y       RANGE OF MOTION:  Pain in abdomen with end-range hip flexion B and hip abduction due to hip adductor tightness    STRENGTH:  P: Power, E: Endurance, R: Repetitions, QF: Quick Flicks, TrA: Transverse Abdominus  P 0   E 0   R    QF    TrA improved     COORDINATION:  Diaphragmatic breathing (DB): good    SPECIAL TESTS:  Q-tip test: nt  Supine cough test: neg  POP-Q: (Pelvic Organ Prolapse - Quantification Exam) per MD note: none ntoed    ·      Body Structures Involved:  1. Nerves  2. Thoracic Cage  3. Digestive Structures  4. Bones  5. Joints  6. Muscles  7. Ligaments Body Functions Affected:  1. Sensory/Pain  2. Respiratory  3. Genitourinary  4. Neuromusculoskeletal  5. Movement Related  6. Digestive Activities and Participation Affected:  1. Learning and Applying Knowledge  2. General Tasks and Demands  3. Communication  4.  Mobility   Number of elements (examined above) that affect the Plan of Care: 3: MODERATE COMPLEXITY   CLINICAL PRESENTATION:   Presentation: Evolving clinical presentation with changing clinical characteristics: MODERATE COMPLEXITY   CLINICAL DECISION MAKING:   Use of outcome tool(s) and clinical judgement create a POC that gives a: Questionable prediction of patient's progress: MODERATE COMPLEXITY        Nyoka Blind PT, DPT

## 2020-05-21 ENCOUNTER — HOSPITAL ENCOUNTER (OUTPATIENT)
Dept: PHYSICAL THERAPY | Age: 60
Discharge: HOME | End: 2020-05-21
Payer: COMMERCIAL

## 2020-05-21 PROCEDURE — 97140 MANUAL THERAPY 1/> REGIONS: CPT

## 2020-05-21 PROCEDURE — 97110 THERAPEUTIC EXERCISES: CPT

## 2020-05-21 NOTE — PROGRESS NOTES
OUTPATIENT PHYSICAL THERAPY: Daily Treatment Note 5/21/2020  Visit Count:  22    Patient has Neuro stimulator; NO BIOFEEDBACK or E-STIM     ICD-10: Treatment Diagnosis  Mixed incontinence (N39.46)   Other muscle spasm (E08.314)   Muscle weakness (generalized) (M62.81)   Other lack of coordination (R27.8)   Precautions/Allergies:   Sulfa (sulfonamide antibiotics); Celecoxib; and Pcn [penicillins]   TREATMENT PLAN:  Effective Dates: 5/14/20 TO 8/12/2020 (90 days). Frequency/Duration: 1 time a week for 90 Day(s)  MEDICAL/REFERRING DIAGNOSIS:  Full incontinence of feces [R15.9]   DATE OF ONSET: May 2017  REFERRING PHYSICIAN: Arias Eldridge MD MD Orders: evaluate and treat  Return MD Appointment: March 2020     MEDICAL/REFERRING DIAGNOSIS:  Full incontinence of feces   REFERRING PHYSICIAN: Arias Eldridge MD  Pre-treatment Symptoms/Complaints:  Everything is going good. My back doesn't hurt as much. Pain: Initial:   2 Post Session:  0/10   Medications Last Reviewed:  5/21/2020    Updated Objective Findings:  See re-evaluation/re-cert note from initial visit and below for updated objective measures from treatment sessions  Improved relaxation  TTP IC,BC (B) and STP and R levator ani  Mulitple scar  R Osacr  tightness   TREATMENT:   THERAPEUTIC EXERCISE: (15 minutes):  Exercises per grid below to improve mobility, strength, and coordination. Required moderate visual, verbal, manual, and tactile cues to promote proper body alignment, promote proper body posture, promote proper body mechanics, and promote proper body breathing techniques. Progressed resistance, range, repetitions, and complexity of movement as indicated.     Exercises:  Patient instructed in pelvic floor exercises listed below:   Date:  12/19/19 Date  2/6/20 Date  2/13/20 Date  2/27/20 Date  3/2/20 Date  3/12/20 Date  5/14/20 Date  5/21/20   Activity/Exercise Parameters          Education HEP, POC, PT goals, PF anatomy/physiology, proper toileting and increase water for bladder health Bladder health and urge suppression. PT provided skilled edu on bladder physiology and filling time and bladder triaining Pt educated on bowel scheduling and bowel sensitivity with adding too much of one thing all at one time. Pt to add one cup a week of water and compliment that with more fiber. Re-eval- Education on progress made and HEP review HEP review and physiology review of PF tightness    PF connection breath 5 mins     6 mins     PF drops 5x 5 mins tactile cues     With belly breathing 6 mins    Frog stretch 1 min    2 mins      Happy baby 1 min          Initial stretching #1 Handout provided and edu on stretches          Self scar mobilizations 2 mins-PT demonstrated          Hip flexors stretch EOB           Prone Rec Fem stretch           Fwd fold stretch       Full body flexion to OH ext stretch 3 mins Seated EOB reaching at angles 3 mins   Seated piriformis stretch           Prone press up abdominal stretching           Cat/Cow        20x to andria pose 2 min   DKTC   2 mins 3 mins    2 mins with rocking   IR stretch           Belly breathing    2x5 with tactile cues and PF relaxation    2 mins 2 mins    Pelvic tilt seated           Hip adductor stretch     1 min ea post manual      Kegel    10x with visual and tactile cues 6 with tactile cues 5 mins tactile cues for testing strength and improving coordination     Thoracic ext seated         15x                             MANUAL THERAPY: (40 minutes): Joint mobilization and Soft tissue mobilization was utilized and necessary because of the patient's restricted joint motion, painful spasm, loss of articular motion, and restricted motion of soft tissue.    (Used abbreviations: MET - muscle energy technique; SCS- Strain counter strain; CTM- Connective tissue mobilizations; CR- Contract/relax; SP- Sustained pressure, TrP- Trigger point release, IASTM- Instrument assisted soft tissue mobilizations, TDN- Trigger point dry needling). -abdominal massage and MFR to bladder  -DTP STRETCHING and STP release B via vaginal canal  -glute and piriformis release (B) the R tighter than the L (NOT TODAY)  -lumbar AND thoracic paraspinal massage  -OI AND B LA MUSCLE GROUP release  -THORACIC AND JOINT P/A JOINT MOBILIZATIONS GRADE III-IV   -B HIP ADDUCTOR MASSAGE/STRUMMING AND RELEASE (not today)            Pt gives verbal and written consent to internal assessment/treatment; no chaperon present. [facility]  The following educational topics were reviewed with patient:  pelvic floor anatomy/pathology and function related to her symptoms, water intake increase, bladder health tips, proper toileting position. Treatment/Session Summary:    · Response to Treatment:  Pt responded very well to manual interventions today with decrease  In tightness and pain. She really liked the child's pose stretch added to HEP  · Communication/Consultation:  POC, PT goals, HEP , bowel scheduling and water/fiber relationship  · Equipment provided today:  HEP handout  · Recommendations/Intent for next treatment session: Next visit will focus on Manual interventions as indicated, progression of exercises/ROM as indicated.     Total Treatment Billable Duration:  55 mins  PT Patient Time In/Time Out  Time In: 0840  Time Out: [address]    Future Appointments   Date Time Provider Yaya Mast   5/28/2020  9:00 AM Eulas Sans N [facility]   6/4/2020  9:00 AM Eulas Sans N [facility]   6/11/2020  9:00 AM Eulas Sans N [facility]   6/18/2020  9:00 AM Eulas Sans N [facility]   6/25/2020  9:00 AM Rickey Capone [facility]

## 2020-05-28 ENCOUNTER — HOSPITAL ENCOUNTER (OUTPATIENT)
Dept: PHYSICAL THERAPY | Age: 60
Discharge: HOME | End: 2020-05-28
Payer: COMMERCIAL

## 2020-05-28 PROCEDURE — 97140 MANUAL THERAPY 1/> REGIONS: CPT

## 2020-05-28 PROCEDURE — 97110 THERAPEUTIC EXERCISES: CPT

## 2020-05-28 NOTE — PROGRESS NOTES
OUTPATIENT PHYSICAL THERAPY: Daily Treatment Note 5/28/2020  Visit Count:  32    Patient has Neuro stimulator; NO BIOFEEDBACK or E-STIM     ICD-10: Treatment Diagnosis  Mixed incontinence (N39.46)   Other muscle spasm (L58.938)   Muscle weakness (generalized) (M62.81)   Other lack of coordination (R27.8)   Precautions/Allergies:   Sulfa (sulfonamide antibiotics); Celecoxib; and Pcn [penicillins]   TREATMENT PLAN:  Effective Dates: 5/14/20 TO 8/12/2020 (90 days). Frequency/Duration: 1 time a week for 90 Day(s)  MEDICAL/REFERRING DIAGNOSIS:  Full incontinence of feces [R15.9]   DATE OF ONSET: May 2017  REFERRING PHYSICIAN: Veronica Omer MD MD Orders: evaluate and treat  Return MD Appointment: March 2020     MEDICAL/REFERRING DIAGNOSIS:  Full incontinence of feces   REFERRING PHYSICIAN: Veronica Omer MD  Pre-treatment Symptoms/Complaints:  I think I might be doing the caT/COW wrong. Back feeling alittle better still    Pain: Initial:   2 Post Session:  0/10   Medications Last Reviewed:  5/28/2020    Updated Objective Findings:  See re-evaluation/re-cert note from initial visit and below for updated objective measures from treatment sessions  Improved relaxation  TTP IC,BC (B) and STP and R levator ani  Mulitple scar  R Oscar  tightness   TREATMENT:   THERAPEUTIC EXERCISE: (10 minutes):  Exercises per grid below to improve mobility, strength, and coordination. Required moderate visual, verbal, manual, and tactile cues to promote proper body alignment, promote proper body posture, promote proper body mechanics, and promote proper body breathing techniques. Progressed resistance, range, repetitions, and complexity of movement as indicated.     Exercises:  Patient instructed in pelvic floor exercises listed below:   Date:  12/19/19 Date  2/6/20 Date  2/13/20 Date  2/27/20 Date  3/2/20 Date  3/12/20 Date  5/14/20 Date  5/21/20 Date  5/28/20   Activity/Exercise Parameters           Education HEP, POC, PT goals, PF anatomy/physiology, proper toileting and increase water for bladder health Bladder health and urge suppression. PT provided skilled edu on bladder physiology and filling time and bladder triaining Pt educated on bowel scheduling and bowel sensitivity with adding too much of one thing all at one time. Pt to add one cup a week of water and compliment that with more fiber. Re-eval- Education on progress made and HEP review HEP review and physiology review of PF tightness     PF connection breath 5 mins     6 mins      PF drops 5x 5 mins tactile cues     With belly breathing 6 mins     Frog stretch 1 min    2 mins       Happy baby 1 min           Initial stretching #1 Handout provided and edu on stretches           Self scar mobilizations 2 mins-PT demonstrated           Hip flexors stretch EOB            Prone Rec Fem stretch            Fwd fold stretch       Full body flexion to OH ext stretch 3 mins Seated EOB reaching at angles 3 mins    Seated piriformis stretch            Prone press up abdominal stretching            Cat/Cow        20x to andria pose 2 min 15x    DKTC   2 mins 3 mins    2 mins with rocking 2 mins   IR stretch            Belly breathing    2x5 with tactile cues and PF relaxation    2 mins 2 mins  2 mins   Pelvic tilt seated            Hip adductor stretch     1 min ea post manual       Kegel    10x with visual and tactile cues 6 with tactile cues 5 mins tactile cues for testing strength and improving coordination      Thoracic ext seated         15x                                MANUAL THERAPY: (35 minutes): Joint mobilization and Soft tissue mobilization was utilized and necessary because of the patient's restricted joint motion, painful spasm, loss of articular motion, and restricted motion of soft tissue.    (Used abbreviations: MET - muscle energy technique; SCS- Strain counter strain; CTM- Connective tissue mobilizations; CR- Contract/relax; SP- Sustained pressure, TrP- Trigger point release, IASTM- Instrument assisted soft tissue mobilizations, TDN- Trigger point dry needling). -abdominal massage and MFR to bladder  -DTP STRETCHING and STP release B via vaginal canal  -psoas release B  -lumbar AND thoracic paraspinal massage  -OI AND B [employer] release (not today)  -THORACIC AND JOINT P/A JOINT MOBILIZATIONS GRADE III-IV   -B HIP ADDUCTOR MASSAGE/STRUMMING AND RELEASE (not today)            Pt gives verbal and written consent to internal assessment/treatment; no chaperon present. [employer] Portal  The following educational topics were reviewed with patient:  pelvic floor anatomy/pathology and function related to her symptoms, water intake increase, bladder health tips, proper toileting position. Treatment/Session Summary:    · Response to Treatment:  Pt responded very well to manual interventions today with decrease in tightness. Pt re-education on cat/cow and needs verbal cues. Pt continues to show progress towards goals. PFM ROM demonstrates increase ROM and was able to achieve 2/5 PFM strength today   · Communication/Consultation:  POC, PT goals, HEP , bowel scheduling and water/fiber relationship  · Equipment provided today:  HEP handout  · Recommendations/Intent for next treatment session: Next visit will focus on Manual interventions as indicated, progression of exercises/ROM as indicated.     Total Treatment Billable Duration:  45 mins  PT Patient Time In/Time Out  Time In: 0900  Time Out: [address]    Future Appointments   Date Time Provider Yaya Mast   6/4/2020  9:00 AM Ankur GOMEZ [facility]   6/11/2020  9:00 AM Ankur GOMEZ [facility]   6/18/2020  9:00 AM Ankur GOMEZ [facility]   6/25/2020  9:00 AM Angela Figueroa [facility]

## 2020-06-04 ENCOUNTER — HOSPITAL ENCOUNTER (OUTPATIENT)
Dept: PHYSICAL THERAPY | Age: 60
Discharge: HOME | End: 2020-06-04
Payer: COMMERCIAL

## 2020-06-04 PROCEDURE — 97110 THERAPEUTIC EXERCISES: CPT

## 2020-06-04 PROCEDURE — 97140 MANUAL THERAPY 1/> REGIONS: CPT

## 2020-06-04 NOTE — PROGRESS NOTES
OUTPATIENT PHYSICAL THERAPY: Daily Treatment Note 6/4/2020  Visit Count:  42    Patient has Neuro stimulator; NO BIOFEEDBACK or E-STIM     ICD-10: Treatment Diagnosis  Mixed incontinence (N39.46)   Other muscle spasm (U05.027)   Muscle weakness (generalized) (M62.81)   Other lack of coordination (R27.8)   Precautions/Allergies:   Sulfa (sulfonamide antibiotics); Celecoxib; and Pcn [penicillins]   TREATMENT PLAN:  Effective Dates: 5/14/20 TO 8/12/2020 (90 days). Frequency/Duration: 1 time a week for 90 Day(s)  MEDICAL/REFERRING DIAGNOSIS:  Full incontinence of feces [R15.9]   DATE OF ONSET: May 2017  REFERRING PHYSICIAN: Breann Peguero MD MD Orders: evaluate and treat  Return MD Appointment: March 2020     MEDICAL/REFERRING DIAGNOSIS:  Full incontinence of feces   REFERRING PHYSICIAN: Breann Peguero MD  Pre-treatment Symptoms/Complaints:  I doing a bit better and back is feeling better. Pain: Initial:   2 Post Session:  0/10   Medications Last Reviewed:  6/4/2020    Updated Objective Findings:  See re-evaluation/re-cert note from initial visit and below for updated objective measures from treatment sessions  Improved relaxation  TTP IC,BC (B) and STP and R levator ani  Mulitple scar  R Oscar  tightness   TREATMENT:   THERAPEUTIC EXERCISE: (10 minutes):  Exercises per grid below to improve mobility, strength, and coordination. Required moderate visual, verbal, manual, and tactile cues to promote proper body alignment, promote proper body posture, promote proper body mechanics, and promote proper body breathing techniques. Progressed resistance, range, repetitions, and complexity of movement as indicated.     Exercises:  Patient instructed in pelvic floor exercises listed below:   Date:  12/19/19 Date  2/6/20 Date  2/13/20 Date  3/2/20 Date  3/12/20 Date  5/14/20 Date  5/21/20 Date  5/28/20 6/4/20   Activity/Exercise Parameters           Education HEP, POC, PT goals, PF anatomy/physiology, proper toileting and increase water for bladder health Bladder health and urge suppression. PT provided skilled edu on bladder physiology and filling time and bladder triaining Pt educated on bowel scheduling and bowel sensitivity with adding too much of one thing all at one time. Pt to add one cup a week of water and compliment that with more fiber. Re-eval- Education on progress made and HEP review HEP review and physiology review of PF tightness      PF connection breath 5 mins    6 mins       PF drops 5x 5 mins tactile cues    With belly breathing 6 mins      Frog stretch 1 min   2 mins        Happy baby 1 min           Initial stretching #1 Handout provided and edu on stretches           Self scar mobilizations 2 mins-PT demonstrated           Hip flexors stretch EOB            Prone Rec Fem stretch            Fwd fold stretch      Full body flexion to OH ext stretch 3 mins Seated EOB reaching at angles 3 mins     Seated piriformis stretch            Prone press up abdominal stretching         1 min   Cat/Cow       20x to andria pose 2 min 15x  3 mins   DKTC   2 mins    2 mins with rocking 2 mins 3 mins   IR stretch            Belly breathing    2x5 with tactile cues and PF relaxation   2 mins 2 mins  2 mins 3 mins   Pelvic tilt seated            Hip adductor stretch    1 min ea post manual        Kegel    6 with tactile cues 5 mins tactile cues for testing strength and improving coordination       Thoracic ext seated        15x                                 MANUAL THERAPY: (35 minutes): Joint mobilization and Soft tissue mobilization was utilized and necessary because of the patient's restricted joint motion, painful spasm, loss of articular motion, and restricted motion of soft tissue.    (Used abbreviations: MET - muscle energy technique; SCS- Strain counter strain; CTM- Connective tissue mobilizations; CR- Contract/relax; SP- Sustained pressure, TrP- Trigger point release, IASTM- Instrument assisted soft tissue mobilizations, TDN- Trigger point dry needling). -abdominal massage and MFR to bladder  -DTP STRETCHING and STP release B via vaginal canal  -psoas release B  -lumbar AND thoracic paraspinal massage  - B [employer] release via vaginal canal (not today)  -THORACIC AND JOINT P/A JOINT MOBILIZATIONS GRADE III-IV   -B HIP ADDUCTOR MASSAGE/STRUMMING AND RELEASE (not today)            Pt gives verbal and written consent to internal assessment/treatment; no chaperon present. [employer] Portal  The following educational topics were reviewed with patient:  pelvic floor anatomy/pathology and function related to her symptoms, water intake increase, bladder health tips, proper toileting position. Treatment/Session Summary:    · Response to Treatment:  Pt responded very well to manual interventions today with decrease in tightness. She continues to require cues for cat/ cow due to coordination. · Communication/Consultation:  POC, PT goals, HEP , bowel scheduling and water/fiber relationship  · Equipment provided today:  HEP handout  · Recommendations/Intent for next treatment session: Next visit will focus on Manual interventions as indicated, progression of exercises/ROM as indicated.     Total Treatment Billable Duration:  45 mins  PT Patient Time In/Time Out  Time In: 0910  Time Out: [address], [address]   Date Time Provider Yaya Mast   6/11/2020  9:00 AM [facility]   6/18/2020  9:00 AM Roma Bosworth N [facility]   6/25/2020  9:00 AM [facility]

## 2020-06-11 ENCOUNTER — HOSPITAL ENCOUNTER (OUTPATIENT)
Dept: PHYSICAL THERAPY | Age: 60
Discharge: HOME | End: 2020-06-11
Payer: COMMERCIAL

## 2020-06-11 PROCEDURE — 97110 THERAPEUTIC EXERCISES: CPT

## 2020-06-11 PROCEDURE — 97140 MANUAL THERAPY 1/> REGIONS: CPT

## 2020-06-11 NOTE — PROGRESS NOTES
OUTPATIENT PHYSICAL THERAPY: Daily Treatment Note 6/11/2020 Visit Count:  46 Patient has Neuro stimulator; NO BIOFEEDBACK or E-STIM  
 
ICD-10: Treatment Diagnosis Mixed incontinence (N39.46) Other muscle spasm (O35.671) Muscle weakness (generalized) (M62.81) Other lack of coordination (R27.8) Precautions/Allergies:  
Sulfa (sulfonamide antibiotics); Celecoxib; and Pcn [penicillins] TREATMENT PLAN: 
Effective Dates: 5/14/20 TO 8/12/2020 (90 days). Frequency/Duration: 1 time a week for 90 Day(s)  MEDICAL/REFERRING DIAGNOSIS: 
Full incontinence of feces [R15.9] DATE OF ONSET: May 2017 REFERRING PHYSICIAN: Tanika Seay MD MD Orders: evaluate and treat Return MD Appointment: March 2020 MEDICAL/REFERRING DIAGNOSIS:  Full incontinence of feces REFERRING PHYSICIAN: Tanika Seay MD 
Pre-treatment Symptoms/Complaints:  I doing a bit better and back is feeling better. Pain: Initial:   2 Post Session:  0/10 Medications Last Reviewed:  6/11/2020 Updated Objective Findings:  See re-evaluation/re-cert note from initial visit and below for updated objective measures from treatment sessions Improved relaxation TTP IC,BC (B) and STP and R levator ani 
Mulitple scar R Oscar  tightness TREATMENT:  
THERAPEUTIC EXERCISE: (10 minutes):  Exercises per grid below to improve mobility, strength, and coordination. Required moderate visual, verbal, manual, and tactile cues to promote proper body alignment, promote proper body posture, promote proper body mechanics, and promote proper body breathing techniques. Progressed resistance, range, repetitions, and complexity of movement as indicated. Exercises:  Patient instructed in pelvic floor exercises listed below: 
 Date: 
12/19/19 Date 2/6/20 Date 2/13/20 Date 3/2/20 Date 3/12/20 Date 5/14/20 Date 5/21/20 Date 5/28/20 6/4/20 Activity/Exercise Parameters Education HEP, POC, PT goals, PF anatomy/physiology, proper toileting and increase water for bladder health Bladder health and urge suppression. PT provided skilled edu on bladder physiology and filling time and bladder triaining Pt educated on bowel scheduling and bowel sensitivity with adding too much of one thing all at one time. Pt to add one cup a week of water and compliment that with more fiber. Re-eval- Education on progress made and HEP review HEP review and physiology review of PF tightness PF connection breath 5 mins    6 mins PF drops 5x 5 mins tactile cues    With belly breathing 6 mins Frog stretch 1 min   2 mins Happy baby 1 min Initial stretching #1 Handout provided and edu on stretches Self scar mobilizations 2 mins-PT demonstrated Hip flexors stretch EOB Prone Rec Fem stretch Fwd fold stretch      Full body flexion to [facility] ext stretch 3 mins Seated EOB reaching at angles 3 mins Seated piriformis stretch Prone press up abdominal stretching         1 min Cat/Cow       20x to andria pose 2 min 15x  3 mins DKTC   2 mins    2 mins with rocking 2 mins 3 mins IR stretch Belly breathing    2x5 with tactile cues and PF relaxation   2 mins 2 mins  2 mins 3 mins Pelvic tilt seated Hip adductor stretch    1 min ea post manual       
Kegel    6 with tactile cues 5 mins tactile cues for testing strength and improving coordination Thoracic ext seated        15x MANUAL THERAPY: (35 minutes): Joint mobilization and Soft tissue mobilization was utilized and necessary because of the patient's restricted joint motion, painful spasm, loss of articular motion, and restricted motion of soft tissue.   
(Used abbreviations: MET - muscle energy technique; SCS- Strain counter strain; CTM- Connective tissue mobilizations; CR- Contract/relax; SP- Sustained pressure, TrP- Trigger point release, IASTM- Instrument assisted soft tissue mobilizations, TDN- Trigger point dry needling). -abdominal massage and MFR to bladder 
-DTP STRETCHING and STP release B via vaginal canal 
-psoas release B 
-lumbar AND thoracic paraspinal massage - B [employer] release via vaginal canal (not today) -THORACIC AND JOINT P/A JOINT MOBILIZATIONS GRADE III-IV  
-B HIP ADDUCTOR MASSAGE/STRUMMING AND RELEASE (not today) Pt gives verbal and written consent to internal assessment/treatment; no chaperon present. [employer] Portal 
The following educational topics were reviewed with patient:  pelvic floor anatomy/pathology and function related to her symptoms, water intake increase, bladder health tips, proper toileting position. Treatment/Session Summary: · Response to Treatment:  Pt responded very well to manual interventions today with decrease in tightness. She continues to require cues for cat/ cow due to coordination. · Communication/Consultation:  POC, PT goals, HEP , bowel scheduling and water/fiber relationship · Equipment provided today:  HEP handout · Recommendations/Intent for next treatment session: Next visit will focus on Manual interventions as indicated, progression of exercises/ROM as indicated. Total Treatment Billable Duration:  45 mins PT Patient Time In/Time Out Time In: 0848 Luane Loose Future Appointments Date Time Provider Yaya Mast 6/11/2020  9:00 AM Anay GOMEZ [facility]  
6/18/2020  9:00 AM Anay GOMEZ [facility]  
6/25/2020  9:00 AM Kory Jerez [facility]

## 2020-06-11 NOTE — PROGRESS NOTES
OUTPATIENT PHYSICAL THERAPY: Daily Treatment Note 6/11/2020  Visit Count:  15    Patient has Neuro stimulator; NO BIOFEEDBACK or E-STIM     ICD-10: Treatment Diagnosis  Mixed incontinence (N39.46)   Other muscle spasm (U80.555)   Muscle weakness (generalized) (M62.81)   Other lack of coordination (R27.8)   Precautions/Allergies:   Sulfa (sulfonamide antibiotics); Celecoxib; and Pcn [penicillins]   TREATMENT PLAN:  Effective Dates: 5/14/20 TO 8/12/2020 (90 days). Frequency/Duration: 1 time a week for 90 Day(s)  MEDICAL/REFERRING DIAGNOSIS:  Full incontinence of feces [R15.9]   DATE OF ONSET: May 2017  REFERRING PHYSICIAN: Rosemary Pearce MD MD Orders: evaluate and treat  Return MD Appointment: March 2020     MEDICAL/REFERRING DIAGNOSIS:  Full incontinence of feces   REFERRING PHYSICIAN: Rosemary Pearce MD  Pre-treatment Symptoms/Complaints:  I doing a bit better and back is feeling better. Pain: Initial:   0 Post Session:  0/10   Medications Last Reviewed:  6/11/2020    Updated Objective Findings:  See re-evaluation/re-cert note from initial visit and below for updated objective measures from treatment sessions  Improved relaxation  TTP IC,BC (B) and STP and R levator ani  Mulitple scar  R Oscar  tightness   TREATMENT:   THERAPEUTIC EXERCISE: (8 minutes):  Exercises per grid below to improve mobility, strength, and coordination. Required moderate visual, verbal, manual, and tactile cues to promote proper body alignment, promote proper body posture, promote proper body mechanics, and promote proper body breathing techniques. Progressed resistance, range, repetitions, and complexity of movement as indicated.     Exercises:  Patient instructed in pelvic floor exercises listed below:   Date:  12/19/19 Date  2/6/20 Date  2/13/20 Date  3/12/20 Date  5/14/20 Date  5/21/20 Date  5/28/20 6/4/20 6/11/20   Activity/Exercise Parameters           Education HEP, POC, PT goals, PF anatomy/physiology, proper toileting and increase water for bladder health Bladder health and urge suppression. PT provided skilled edu on bladder physiology and filling time and bladder triaining Pt educated on bowel scheduling and bowel sensitivity with adding too much of one thing all at one time. Pt to add one cup a week of water and compliment that with more fiber. Re-eval- Education on progress made and HEP review HEP review and physiology review of PF tightness       PF connection breath 5 mins   6 mins        PF drops 5x 5 mins tactile cues   With belly breathing 6 mins       Frog stretch 1 min           Happy baby 1 min           Initial stretching #1 Handout provided and edu on stretches           Self scar mobilizations 2 mins-PT demonstrated           Hip flexors stretch EOB            Prone Rec Fem stretch            Fwd fold stretch     Full body flexion to OH ext stretch 3 mins Seated EOB reaching at angles 3 mins      Seated piriformis stretch            Prone press up abdominal stretching        1 min    Cat/Cow      20x to andria pose 2 min 15x  3 mins 5 mins   DKTC   2 mins   2 mins with rocking 2 mins 3 mins 3 min   IR stretch            Belly breathing    2x5 with tactile cues and PF relaxation  2 mins 2 mins  2 mins 3 mins    Pelvic tilt seated            Hip adductor stretch            Kegel    5 mins tactile cues for testing strength and improving coordination        Thoracic ext seated       15x                                  MANUAL THERAPY: (30 minutes): Joint mobilization and Soft tissue mobilization was utilized and necessary because of the patient's restricted joint motion, painful spasm, loss of articular motion, and restricted motion of soft tissue.    (Used abbreviations: MET - muscle energy technique; SCS- Strain counter strain; CTM- Connective tissue mobilizations; CR- Contract/relax; SP- Sustained pressure, TrP- Trigger point release, IASTM- Instrument assisted soft tissue mobilizations, TDN- Trigger point dry needling). -abdominal massage and MFR to bladder  -DTP STRETCHING and STP release B via vaginal canal  -psoas release B  -lumbar AND thoracic paraspinal massage  - B [employer] release via vaginal canal (not today)  -THORACIC AND JOINT P/A JOINT MOBILIZATIONS GRADE III-IV   -B HIP ADDUCTOR MASSAGE/STRUMMING AND RELEASE (not today)            Pt gives verbal and written consent to internal assessment/treatment; no chaperon present. [employer] Portal  The following educational topics were reviewed with patient:  pelvic floor anatomy/pathology and function related to her symptoms, water intake increase, bladder health tips, proper toileting position. Treatment/Session Summary:    · Response to Treatment:  Pt responded very well to manual interventions today with decrease in tightness. No pain today. Pt progressing towards dc  · Communication/Consultation:  POC, PT goals, HEP , bowel scheduling and water/fiber relationship  · Equipment provided today:  HEP handout  · Recommendations/Intent for next treatment session: Next visit will focus on Manual interventions as indicated, progression of exercises/ROM as indicated.     Total Treatment Billable Duration:  38 mins  PT Patient Time In/Time Out  Time In: 0848  Time Out: 1327  Luane Loose    Future Appointments   Date Time Provider Yaya Mast   6/18/2020  9:00 AM Anay GOMEZ [facility]   6/25/2020  9:00 AM Kory Jerez [facility]

## 2020-06-17 NOTE — PROGRESS NOTES
OUTPATIENT PHYSICAL THERAPY: Daily Treatment Note 6/18/2020  Visit Count:  15    Patient has Neuro stimulator; NO BIOFEEDBACK or E-STIM     ICD-10: Treatment Diagnosis  Mixed incontinence (N39.46)   Other muscle spasm (K96.431)   Muscle weakness (generalized) (M62.81)   Other lack of coordination (R27.8)   Precautions/Allergies:   Sulfa (sulfonamide antibiotics); Celecoxib; and Pcn [penicillins]   TREATMENT PLAN:  Effective Dates: 5/14/20 TO 8/12/2020 (90 days). Frequency/Duration: 1 time a week for 90 Day(s)  MEDICAL/REFERRING DIAGNOSIS:  Full incontinence of feces [R15.9]   DATE OF ONSET: May 2017  REFERRING PHYSICIAN: Ang Saldaña MD MD Orders: evaluate and treat  Return MD Appointment: March 2020     MEDICAL/REFERRING DIAGNOSIS:  Full incontinence of feces   REFERRING PHYSICIAN: Ang Saldaña MD  Pre-treatment Symptoms/Complaints:  I saw my MD yesterday and she discovered why I am having pain at the end of the day around my rectum and its due to wiping with toilet paper so she gave me some cream and told me to use wipes at the end of the day. only mild discomfort with the rectal exam yesterday!!.      Pain: Initial:   0 Post Session:  0/10   Medications Last Reviewed:  6/18/2020    Updated Objective Findings:  See discharge summary from todays visit and below for updated objective measures from treatment sessions     TREATMENT:   THERAPEUTIC EXERCISE: (15 minutes):  Exercises per grid below to improve mobility, strength, and coordination. Required moderate visual, verbal, manual, and tactile cues to promote proper body alignment, promote proper body posture, promote proper body mechanics, and promote proper body breathing techniques. Progressed resistance, range, repetitions, and complexity of movement as indicated.     Exercises:  Patient instructed in pelvic floor exercises listed below:   Date:  12/19/19 Date  2/6/20 Date  3/12/20 Date  5/14/20 Date  5/21/20 Date  5/28/20 6/4/20 6/11/20 6/18/20 Activity/Exercise Parameters           Education HEP, POC, PT goals, PF anatomy/physiology, proper toileting and increase water for bladder health Bladder health and urge suppression. PT provided skilled edu on bladder physiology and filling time and bladder triaining Re-eval- Education on progress made and HEP review HEP review and physiology review of PF tightness     Re-education  On HEP continuation to obtain progress made with therapy   PF connection breath 5 mins  6 mins         PF drops 5x 5 mins tactile cues  With belly breathing 6 mins        Frog stretch 1 min           Happy baby 1 min           Initial stretching #1 Handout provided and edu on stretches           Self scar mobilizations 2 mins-PT demonstrated           Hip flexors stretch EOB            Prone Rec Fem stretch            Fwd fold stretch    Full body flexion to OH ext stretch 3 mins Seated EOB reaching at angles 3 mins       Seated piriformis stretch            Prone press up abdominal stretching       1 min     Cat/Cow     20x to andria pose 2 min 15x  3 mins 5 mins 5 mins    DKTC     2 mins with rocking 2 mins 3 mins 3 min 3x1 min   IR stretch            Belly breathing     2 mins 2 mins  2 mins 3 mins  2 mins   Pelvic tilt seated            Hip adductor stretch            Kegel   5 mins tactile cues for testing strength and improving coordination         Thoracic ext seated      15x                                   MANUAL THERAPY: (30 minutes): Joint mobilization and Soft tissue mobilization was utilized and necessary because of the patient's restricted joint motion, painful spasm, loss of articular motion, and restricted motion of soft tissue. (Used abbreviations: MET - muscle energy technique; SCS- Strain counter strain; CTM- Connective tissue mobilizations; CR- Contract/relax; SP- Sustained pressure, TrP- Trigger point release, IASTM- Instrument assisted soft tissue mobilizations, TDN- Trigger point dry needling).    -abdominal massage and MFR to bladder  -DTP STRETCHING and STP release B via vaginal canal (not needed today)  -psoas release B  -lumbar AND thoracic paraspinal massage  - B [employer] release via vaginal canal (not today)  -THORACIC AND JOINT P/A JOINT MOBILIZATIONS GRADE III-IV   -B HIP ADDUCTOR MASSAGE/STRUMMING AND RELEASE (not today)            Pt gives verbal and written consent to internal assessment/treatment; no chaperon present. [employer]  The following educational topics were reviewed with patient:  pelvic floor anatomy/pathology and function related to her symptoms, water intake increase, bladder health tips, proper toileting position. Treatment/Session Summary:    · Response to Treatment:  Pt responded very well to manual interventions today with decrease in tension. She reports confidence With HEP and demonstrated I. She has met all her goals for PT and d/c at this time. See d/c inna for full details  · Communication/Consultation:  D/C education and continuation with HEP  · Equipment provided today:  HEP handout  ·     Total Treatment Billable Duration:  45 mins  PT Patient Time In/Time Out  Time In: 0900  Time Out: 0945  Teresa Aldana    No future appointments.

## 2020-06-17 NOTE — THERAPY DISCHARGE
Allison Camarena  : 1960  Primary: [facility]  Secondary:  [facility] [address]  [address]. [place] [address], [address]  [phone]   [phone]          OUTPATIENT PHYSICAL THERAPY:Discharge 2020   ICD-10: Treatment Diagnosis  Mixed incontinence (N39.46)   Other muscle spasm (H91.875)   Muscle weakness (generalized) (M62.81)   Other lack of coordination (R27.8)   Precautions/Allergies:   Sulfa (sulfonamide antibiotics); Celecoxib; and Pcn [penicillins]   TREATMENT PLAN:  Effective Dates: 20 TO 2020 (90 days). Frequency/Duration: 1 time a week for 90 Day(s)  MEDICAL/REFERRING DIAGNOSIS:  Full incontinence of feces   DATE OF ONSET: May 2017  REFERRING PHYSICIAN: Philippe Washington MD MD Orders: evaluate and treat  Return MD Appointment: 2020     Discharge Summary: Allison Camarena has has attended 6 PT sessions since 20 to 20 as a continuation of her POC. She has met majority of her goals for PT with reasoning described below for those not met. Pt has demonstrated independence with HEP and made significant improvements in PF ROM, strength, and coordination that have reduced patient's symptoms of incontinence, decreased her pain with intercourse and yearly speculum and rectal exams and improved her overall QOL. Pt has been discharged  From PT at this time. Re-certification: Allison Camarena has attended 6 PT sessions including eval. She returns to PT after COVID-19 clinic closure with reports of tightness and some abdominal and lowerback pain that has begun during the quarantine and she continues to have some ABIGAIL with lifting and sneezing. She has demonstrated improved PF muscle ROM, flexibility and diaphragmatic breathing.  However she continues to have minor pain with intercourse, and leakage with lifting and urgency, and decreased vaginal vault size and pain with speculum and rectal examinations and low back and pelvic pain. Pt continues to have pain and tenderness at (B) LA muscle group and tightness of DTP (B) and benjamin tightness (B) and abdominal trigger points and limited lumbar paraspinal soft tissue mobility and hip ROM. Pt will continue to  benefit from manual interventions (soft tissue mobilizations, joint mobilizations, TDN, MET,etc.) as indicated, therapeutic exercises/activities as indicated, and biofeedback for neuromuscular re-education to improve patient's quality of life and functional mobility. INITIAL ASSESSMENT:  Ms. Nilo Padilla presents to physical therapy with c/o pain with vaginal insertion during intercourse and speculum and rectal examinations, urine and fecal leakage with lifting, bending, and post intercourse. She presents with decreased vaginal vault size, muscle guarding with entry into vagina with internal PF assessment, tenderness at Ischiocavernosus and bulbocavernosus (B) and referral of L hip pain with palpation of L levator ani muscles. Pt also presents with multiple scars with decreased mobility, pain along anterior pelvis and pubic bone. She lack PFM coordination and strength due to muscle guarding and tightness. These S/S are indicative of Mixed urinary incontinence, fecal incontinence, decreased coordination and strength and muscle spasms. Patient will benefit from manual interventions (soft tissue mobilizations, joint mobilizations, TDN, MET,etc.) as indicated, therapeutic exercises/activities as indicated, and biofeedback for neuromuscular re-education to improve patient's quality of life and functional mobility. 1.  1.      GOALS: (Goals have been discussed and agreed upon with patient.)  Short-Term Functional Goals: Time Frame: 6 weeks  1. Patient will be independent and compliant with HEP (met 3/12/20)  2.  Patient will tolerate manual interventions (joint mobilizations, STM, TDN, trigger point release, PROM, etc.) to improve joint ROM/soft tissue mobility of restricted structures to improve functional mobility. (met 3/12/20)  3. Patient will report <3/10 pain with functional ADLs and vaginal insertion to have annual speculum and rectal examinations done to improve quality of life (met 3/12/20)  Discharge Goals: Time Frame: 12 weeks  1. Pt will be independent with comprehensive HEP (met 3/12/20)  2. Pt will demonstrate proper lifting mechanics to lift 10# from floor to stand 10x with min to no pain or compensation or leaking of urine of feces to demonstrate improved pressure control system for pelvic floor health. (met- minor leaking reported at work if she is holding her bladder then goes to lift)  3. Pt will report <1-2/10 pain with functional ADLs and vaginal insertion to have annual speculum and rectal examinations done  for improved mobility. (met 6/18/20)  4. Pt will report increase water intake to >60 oz for good bladder health (not met due to pt unable to drink >32oz without diarrhea due to her PMH)    Outcome Measure: Tool Used: Pelvic Floor Disability Index (PFDI-20)  Score:  Initial: 32 Most Recent: 23 (Date: 6-18-20 )   Interpretation of Score: This survey asks questions concerning certain  bowel, bladder, or pelvic symptoms and how much this symptoms interfere with daily activiites. Each section is scored on a 0-4 scale, 4 representing the greatest disability. The scores of each section are added together for a total score out of 70. ·     Total Duration: 45 mins  PT Patient Time In/Time Out  Time In: 0900  Time Out: 0945    Rehabilitation Potential For Stated Goals: [address] therapy, I certify that the treatment plan above will be carried out by a therapist or under their direction.   Thank you for this referral,  Clemencia Li                     PAIN/SUBJECTIVE:   Initial:   2/10 Post Session:  3/10     HISTORY:   History of Present Injury/Illness (Reason for Referral):  Nick Victor states she was dx with rectal cancer in May 2017 and had a colostomy bag until June 2018. She was still dealing with fecal incontinence she had a device placed May 2019 in her back to help her bulk up her stool and hold it to get to the bathroom; NEUROSTIMULATOR ELECTRODE ARRAY;SACRAL NERVE (TRANSFORAMINAL PLACEMENT). She then went to see a nutritionist to help her bulk her stool. Urinary: Frequency 10 x/day, 1 x/night.                       -Positive for stress urinary incontinence (ABIGAIL), urge urinary incontinence (UUI), mixed urinary incontinence (VALERY), urgency, frequency                     -Pt uses/does not use pads for protection; 1-3/4 pads per day (PPD)                     -Denies , incomplete emptying, urinary hesitancy, dysuria, hematuria, nocturia. Fluid intake: 16 oz water/day; bladder irritants include: 2 cups Coffee, 1-2 gatorade, 1-2 cans of tea    Bowel: Frequency 3x/day. -Positive for pushing/straining with BM, incomplete emptying, fecal incontinence.                      -Negative for pain with bowel movement (BM), pushing/straining with BM, incomplete emptying, fecal incontinence, constipation.                       -[place] stool type: 6-7 but mostly 4-5                      -Use of stool softeners or laxatives? No-uses -Diphen/atrop 2.5 mph1x/aday and 1 Fibercon a day    Sexual: Pt is sexually active. -Male partners.                    -Positive for dyspareunia. -went to the [facility] and they recommended a different lubricant (water based) and vibrator which has seemed to help with the lubrication better.  -A couple of times I have had bowel movements after sex and its embarrassing for me.     Amalia Osborne Pain: Location: anterior pelvic area and L anterior groin.                         -Worse with sex. -Relieved with a few minutes after sex. -Max pain 6-7/10. Min pain 0/10.     Past Medical History/Comorbidities:   Ms. Geovani Albrecht  has a past medical history of Chronic pain. She also has no past medical history of Arrhythmia, Arthritis, Asthma, Autoimmune disease ([address] CAD (coronary artery disease), Cancer ([address] Chronic kidney disease, Coagulation defects, COPD, Diabetes ([address] Difficult intubation, GERD (gastroesophageal reflux disease), Heart failure ([address] Hypertension, Liver disease, Malignant hyperthermia due to anesthesia, Nausea & vomiting, Other ill-defined conditions(799.89), Pseudocholinesterase deficiency, Psychiatric disorder, PUD (peptic ulcer disease), Seizures ([address] Stroke ([address] Thromboembolus ([address] Thyroid disease, Unspecified adverse effect of anesthesia, or Unspecified sleep apnea. Ms. Rosamaria Whalen  has a past surgical history that includes hx heent and hx  section. NEUROSTIMULATOR ELECTRODE ARRAY;SACRAL NERVE (TRANSFORAMINAL PLACEMENT) (May 2019) and Rectal surgical removal of cancer (2017)    Social History/Living Environment:    Lives with   Have you ever had any pelvic trauma (orthopedic in nature, fall, MVA, etc.)? no  Have you ever experienced any unwanted physical or sexual contact? no  Have you ever experienced any form of medical trauma (GYN, urological, GI, etc)? -3 vaginal births and 1  due to breach birth    Prior Level of Function/Work/Activity:   lifting, moving around       Ambulatory/Rehab Services H2 Model Falls Risk Assessment    Risk Factors:       No Risk Factors Identified Ability to Rise from Chair:       (0)  Ability to rise in a single movement    Falls Prevention Plan:       No modifications necessary   Total: (5 or greater = High Risk): 0     [facility] [employer]. All Rights Reserved. [facility] Patent #[id number].  Federal Law prohibits the replication, distribution or use without written permission from [facility] [employer]     Current Medications:       Current Outpatient Medications:     mirabegron ER (MYRBETRIQ) 50 mg ER tablet, Take 50 mg by mouth daily., Disp: , Rfl:     MYRBETRIQ 50 mg ER tablet, Take 1 Tab by mouth daily. , Disp: 30 Tab, Rfl: 11   Date Last Reviewed:  6/18/2020   EXAMINATION:   OBSERVATION: Lumbar ROM WFL- slight abdominal discomfort going from flexion to extension  Improved lumbar flexion    External Observation:   · Voluntary Contraction: fair  · Voluntary Relaxation: good  · Involuntary Contraction: minimal  · Involuntary Relaxation: good  · Perineal Body Assessment: WFL  · Anal Clifton: absent  · Skin Integrity: good  · Vaginal Vault Size: WFL    PALPATION: via vaginal vault    Superficial Pelvic Floor Musculature (PFM): Tender? Intermediate PFM Tender? Deep PFM Tender? External Palpation Tender? R Superficial Transverse Perineal  R Deep Transverse Perineal N R. Puborectalis  Abdominals N   L Superficial Transverse Perineal  L Deep Transverse Perineal N L. Puborectalis N R/L hip adductors N   R Ischiocavernosus  R Compressor Urethra  R. Pubococcygeus  R/L Iliacus/psoas N   L Ischiocavernosus  L Compressor Urethra  L. Pubococcygeus  R/L Glutes     R Bulbocavernosus    R. Ileococcygeus N R/L Piriformis N   L Bulbocavernosus    L. Ileococcygeus N         R. Obturator Internus N         L. Obturator Internus          R. Coccygeus Y-due to anal irritation         L. Coccygeus Ydue to anal irritation       RANGE OF MOTION:  Pain in abdomen with end-range hip flexion B and hip abduction due to hip adductor tightness    STRENGTH:  P: Power, E: Endurance, R: Repetitions, QF: Quick Flicks, TrA: Transverse Abdominus  P 1-limited due to lack of PF ROM,   E 1   R 5   QF    TrA improved     COORDINATION:  Diaphragmatic breathing (DB): good    SPECIAL TESTS:  Q-tip test: nt  Supine cough test: neg  POP-Q: (Pelvic Organ Prolapse - Quantification Exam) per MD note: none ntoed    ·      Body Structures Involved:  1. Nerves  2. Thoracic Cage  3. Digestive Structures  4. Bones  5. Joints  6. Muscles  7.  Ligaments Body Functions Affected:  1. Sensory/Pain  2. Respiratory  3. Genitourinary  4. Neuromusculoskeletal  5. Movement Related  6. Digestive Activities and Participation Affected:  1. Learning and Applying Knowledge  2. General Tasks and Demands  3. Communication  4.  Mobility                          Augustin Lyle PT, DPT

## 2020-06-18 ENCOUNTER — HOSPITAL ENCOUNTER (OUTPATIENT)
Dept: PHYSICAL THERAPY | Age: 60
Discharge: HOME | End: 2020-06-18
Payer: COMMERCIAL

## 2020-06-18 PROCEDURE — 97110 THERAPEUTIC EXERCISES: CPT

## 2020-06-18 PROCEDURE — 97140 MANUAL THERAPY 1/> REGIONS: CPT

## 2020-06-25 ENCOUNTER — APPOINTMENT (OUTPATIENT)
Dept: PHYSICAL THERAPY | Age: 60
End: 2020-06-25
Payer: COMMERCIAL

## 2021-09-03 NOTE — PROGRESS NOTES
OUTPATIENT PHYSICAL THERAPY: Daily Treatment Note 12/26/2019  Visit Count:  2    Patient has Neuro stimulator; NO BIOFEEDBACK or E-STIM     ICD-10: Treatment Diagnosis  Mixed incontinence (N39.46)   Other muscle spasm (G03.640)   Muscle weakness (generalized) (M62.81)   Other lack of coordination (R27.8)   Precautions/Allergies:   Sulfa (sulfonamide antibiotics); Celecoxib; and Pcn [penicillins]   TREATMENT PLAN:  Effective Dates: 12/19/2019 TO 3/18/2020 (90 days). Frequency/Duration: 1 time a week for 90 Day(s) MEDICAL/REFERRING DIAGNOSIS:  Full incontinence of feces [R15.9]   DATE OF ONSET: May 2017  REFERRING PHYSICIAN: Sury Nava MD MD Orders: evaluate and treat  Return MD Appointment: March 2020     MEDICAL/REFERRING DIAGNOSIS:  Full incontinence of feces [R15.9]   REFERRING PHYSICIAN: Sury Nava MD  Pre-treatment Symptoms/Complaints:  A little confused about the piriformis stretch. Breathing exercises are really good. Drinking 4 cups of water today vs 2. Leaking less. Im able to breath and make it to the bathroom  Pain: Initial:   2 Post Session:  0/10   Medications Last Reviewed:  12/26/2019    Updated Objective Findings:  See evaluation note from today  Very guarded with vaginal insertion  TTP IC,BC (B) and STP and L levator ani  Mulitple scar   TREATMENT:   THERAPEUTIC EXERCISE: (10 minutes):  Exercises per grid below to improve mobility, strength, and coordination. Required moderate visual, verbal, manual, and tactile cues to promote proper body alignment, promote proper body posture, promote proper body mechanics, and promote proper body breathing techniques. Progressed resistance, range, repetitions, and complexity of movement as indicated.     Exercises:  Patient instructed in pelvic floor exercises listed below:   Date:  12/19/19 Date:  12/26/19 Date:     Activity/Exercise Parameters Parameters Parameters   Education HEP, POC, PT goals, PF anatomy/physiology, proper toileting and increase water for bladder health     PF connection breath 5 mins     PF drops 5x     Frog stretch 1 min     Happy baby 1 min 2 mins    Initial stretching #1 Handout provided and edu on stretches     Self scar mobilizations 2 mins-PT demonstrated     Hip flexors stretch EOB  2 mins ea    Prone Rec Fem stretch  2 mins ea    Fwd fold stretch  5x    Seated piriformis stretch  2 min        MANUAL THERAPY: (40 minutes): Joint mobilization and Soft tissue mobilization was utilized and necessary because of the patient's restricted joint motion, painful spasm, loss of articular motion, and restricted motion of soft tissue. (Used abbreviations: MET - muscle energy technique; SCS- Strain counter strain; CTM- Connective tissue mobilizations; CR- Contract/relax; SP- Sustained pressure, TrP- Trigger point release, IASTM- Instrument assisted soft tissue mobilizations, TDN- Trigger point dry needling). -abdominal massage  -Iliopsoas release (B)  -lumbar paraspinal massage  -STP (R) release  DTP release (B)  -iliococcygeus release (R)          Pt gives verbal and written consent to internal assessment/treatment; no chaperon present. [employer] Portal  The following educational topics were reviewed with patient:  pelvic floor anatomy/pathology and function related to her symptoms, water intake increase, bladder health tips, proper toileting position. Treatment/Session Summary:    · Response to Treatment:  Pt demonstrated more restricted soft tissue mobility throughout the R hip and pelvic floor. Very good response to stretching with visual cues for maintaining gentle stretch. Pt reported less pain in lower back after manual interventions    · Communication/Consultation:  POC, PT goals, HEP   · Equipment provided today:  HEP handout   · Recommendations/Intent for next treatment session: Next visit will focus on Manual interventions as indicated, progression of exercises/ROM as indicated.     Total Treatment Billable Duration: 50 mins  PT Patient Time In/Time Out  Time In: 1055  Time Out: 585 [facility] Appointments   Date Time Provider Yaya Mast   1/9/2020 10:00 AM Derl Sober N [facility]   1/16/2020 10:00 [facility]   1/23/2020 10:00 AM lEena Hannah [facility] Additional Progress Note...